# Patient Record
Sex: MALE | Race: WHITE | URBAN - NONMETROPOLITAN AREA
[De-identification: names, ages, dates, MRNs, and addresses within clinical notes are randomized per-mention and may not be internally consistent; named-entity substitution may affect disease eponyms.]

---

## 2017-01-03 ENCOUNTER — AMBULATORY - GICH (OUTPATIENT)
Dept: FAMILY MEDICINE | Facility: OTHER | Age: 82
End: 2017-01-03

## 2017-01-03 DIAGNOSIS — M19.90 OSTEOARTHRITIS: ICD-10-CM

## 2017-01-03 DIAGNOSIS — R19.7 DIARRHEA: ICD-10-CM

## 2017-03-02 ENCOUNTER — HISTORY (OUTPATIENT)
Dept: FAMILY MEDICINE | Facility: OTHER | Age: 82
End: 2017-03-02

## 2017-03-02 ENCOUNTER — OFFICE VISIT - GICH (OUTPATIENT)
Dept: FAMILY MEDICINE | Facility: OTHER | Age: 82
End: 2017-03-02

## 2017-03-02 DIAGNOSIS — Z23 ENCOUNTER FOR IMMUNIZATION: ICD-10-CM

## 2017-03-02 DIAGNOSIS — I10 ESSENTIAL (PRIMARY) HYPERTENSION: ICD-10-CM

## 2017-03-02 DIAGNOSIS — E80.4 GILBERT'S SYNDROME: ICD-10-CM

## 2017-03-02 DIAGNOSIS — M10.9 GOUT: ICD-10-CM

## 2017-03-02 DIAGNOSIS — R71.8 OTHER ABNORMALITY OF RED BLOOD CELLS: ICD-10-CM

## 2017-03-02 DIAGNOSIS — N40.1 ENLARGED PROSTATE WITH LOWER URINARY TRACT SYMPTOMS (LUTS): ICD-10-CM

## 2017-03-02 DIAGNOSIS — M19.90 OSTEOARTHRITIS: ICD-10-CM

## 2017-03-02 DIAGNOSIS — N42.9 DISORDER OF PROSTATE: ICD-10-CM

## 2017-03-02 DIAGNOSIS — E29.1 TESTICULAR HYPOFUNCTION: ICD-10-CM

## 2017-03-02 DIAGNOSIS — K62.5 HEMORRHAGE OF ANUS AND RECTUM: ICD-10-CM

## 2017-03-02 DIAGNOSIS — E78.5 HYPERLIPIDEMIA: ICD-10-CM
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
## 2017-03-02 LAB
A/G RATIO - HISTORICAL: 1.5 (ref 1–2)
ABSOLUTE BASOPHILS - HISTORICAL: 0.1 THOU/CU MM
ABSOLUTE EOSINOPHILS - HISTORICAL: 0.4 THOU/CU MM
ABSOLUTE LYMPHOCYTES - HISTORICAL: 1.6 THOU/CU MM (ref 0.9–2.9)
ABSOLUTE MONOCYTES - HISTORICAL: 0.4 THOU/CU MM
ABSOLUTE NEUTROPHILS - HISTORICAL: 3 THOU/CU MM (ref 1.7–7)
ALBUMIN SERPL-MCNC: 4.2 G/DL (ref 3.5–5.7)
ALP SERPL-CCNC: 68 IU/L (ref 34–104)
ALT (SGPT) - HISTORICAL: 21 IU/L (ref 7–52)
ANION GAP - HISTORICAL: 11 (ref 5–18)
AST SERPL-CCNC: 23 IU/L (ref 13–39)
BASOPHILS # BLD AUTO: 1 %
BILIRUB SERPL-MCNC: 1 MG/DL (ref 0.3–1)
BUN SERPL-MCNC: 20 MG/DL (ref 7–25)
BUN/CREAT RATIO - HISTORICAL: 16
CALCIUM SERPL-MCNC: 10 MG/DL (ref 8.6–10.3)
CHLORIDE SERPLBLD-SCNC: 105 MMOL/L (ref 98–107)
CHOL/HDL RATIO - HISTORICAL: 3.17
CHOLESTEROL TOTAL: 171 MG/DL
CO2 SERPL-SCNC: 26 MMOL/L (ref 21–31)
CREAT SERPL-MCNC: 1.25 MG/DL (ref 0.7–1.3)
EOSINOPHIL NFR BLD AUTO: 7.1 %
ERYTHROCYTE [DISTWIDTH] IN BLOOD BY AUTOMATED COUNT: 13.7 % (ref 11.5–15.5)
GFR IF NOT AFRICAN AMERICAN - HISTORICAL: 55 ML/MIN/1.73M2
GLOBULIN - HISTORICAL: 2.8 G/DL (ref 2–3.7)
GLUCOSE SERPL-MCNC: 119 MG/DL (ref 70–105)
HCT VFR BLD AUTO: 48.5 % (ref 37–53)
HDLC SERPL-MCNC: 54 MG/DL (ref 23–92)
HEMOGLOBIN: 16.2 G/DL (ref 13.5–17.5)
LDLC SERPL CALC-MCNC: 108 MG/DL
LYMPHOCYTES NFR BLD AUTO: 28.8 % (ref 20–44)
MCH RBC QN AUTO: 33.7 PG (ref 26–34)
MCHC RBC AUTO-ENTMCNC: 33.3 G/DL (ref 32–36)
MCV RBC AUTO: 101 FL (ref 80–100)
MONOCYTES NFR BLD AUTO: 7 %
NEUTROPHILS NFR BLD AUTO: 56.1 % (ref 42–72)
NON-HDL CHOLESTEROL - HISTORICAL: 117 MG/DL
PATIENT STATUS - HISTORICAL: ABNORMAL
PLATELET # BLD AUTO: 195 THOU/CU MM (ref 140–440)
PMV BLD: 7.7 FL (ref 6.5–11)
POTASSIUM SERPL-SCNC: 4.2 MMOL/L (ref 3.5–5.1)
PROT SERPL-MCNC: 7 G/DL (ref 6.4–8.9)
PSA TOTAL (DIAGNOSTIC) - HISTORICAL: 2.4 NG/ML
RED BLOOD COUNT - HISTORICAL: 4.79 MIL/CU MM (ref 4.3–5.9)
SODIUM SERPL-SCNC: 142 MMOL/L (ref 133–143)
TRIGL SERPL-MCNC: 45 MG/DL
URATE SERPL-MCNC: 6.1 MG/DL (ref 4.4–7.6)
WHITE BLOOD COUNT - HISTORICAL: 5.4 THOU/CU MM (ref 4.5–11)

## 2017-03-03 LAB — VIT B12 SERPL-MCNC: 601 PG/ML (ref 180–914)

## 2017-03-05 LAB
TESTOSTERONE FREE - HISTORICAL: 9.23 NG/DL
TESTOSTERONE,TOTAL - HISTORICAL: 710 NG/DL

## 2017-08-07 ENCOUNTER — COMMUNICATION - GICH (OUTPATIENT)
Dept: FAMILY MEDICINE | Facility: OTHER | Age: 82
End: 2017-08-07

## 2017-08-30 ENCOUNTER — OFFICE VISIT - GICH (OUTPATIENT)
Dept: FAMILY MEDICINE | Facility: OTHER | Age: 82
End: 2017-08-30

## 2017-08-30 ENCOUNTER — HISTORY (OUTPATIENT)
Dept: FAMILY MEDICINE | Facility: OTHER | Age: 82
End: 2017-08-30

## 2017-08-30 DIAGNOSIS — R40.0 SOMNOLENCE: ICD-10-CM

## 2017-08-30 DIAGNOSIS — I10 ESSENTIAL (PRIMARY) HYPERTENSION: ICD-10-CM

## 2017-08-30 DIAGNOSIS — L57.0 ACTINIC KERATOSIS: ICD-10-CM

## 2017-08-30 DIAGNOSIS — R53.83 OTHER FATIGUE: ICD-10-CM

## 2017-08-30 LAB
A/G RATIO - HISTORICAL: 1.5 (ref 1–2)
ABSOLUTE BASOPHILS - HISTORICAL: 0.1 THOU/CU MM
ABSOLUTE EOSINOPHILS - HISTORICAL: 0.4 THOU/CU MM
ABSOLUTE IMMATURE GRANULOCYTES(METAS,MYELOS,PROS) - HISTORICAL: 0 THOU/CU MM
ABSOLUTE LYMPHOCYTES - HISTORICAL: 1.6 THOU/CU MM (ref 0.9–2.9)
ABSOLUTE MONOCYTES - HISTORICAL: 0.5 THOU/CU MM
ABSOLUTE NEUTROPHILS - HISTORICAL: 3.1 THOU/CU MM (ref 1.7–7)
ALBUMIN SERPL-MCNC: 4.1 G/DL (ref 3.5–5.7)
ALP SERPL-CCNC: 73 IU/L (ref 34–104)
ALT (SGPT) - HISTORICAL: 24 IU/L (ref 7–52)
ANION GAP - HISTORICAL: 10 (ref 5–18)
AST SERPL-CCNC: 23 IU/L (ref 13–39)
BASOPHILS # BLD AUTO: 1.4 %
BILIRUB SERPL-MCNC: 1 MG/DL (ref 0.3–1)
BUN SERPL-MCNC: 25 MG/DL (ref 7–25)
BUN/CREAT RATIO - HISTORICAL: 20
CALCIUM SERPL-MCNC: 10 MG/DL (ref 8.6–10.3)
CHLORIDE SERPLBLD-SCNC: 105 MMOL/L (ref 98–107)
CO2 SERPL-SCNC: 27 MMOL/L (ref 21–31)
CREAT SERPL-MCNC: 1.23 MG/DL (ref 0.7–1.3)
EOSINOPHIL NFR BLD AUTO: 6.7 %
ERYTHROCYTE [DISTWIDTH] IN BLOOD BY AUTOMATED COUNT: 14.4 % (ref 11.5–15.5)
GFR IF NOT AFRICAN AMERICAN - HISTORICAL: 56 ML/MIN/1.73M2
GLOBULIN - HISTORICAL: 2.7 G/DL (ref 2–3.7)
GLUCOSE SERPL-MCNC: 102 MG/DL (ref 70–105)
HCT VFR BLD AUTO: 43.3 % (ref 37–53)
HEMOGLOBIN: 15 G/DL (ref 13.5–17.5)
IMMATURE GRANULOCYTES(METAS,MYELOS,PROS) - HISTORICAL: 0.2 %
LYMPHOCYTES NFR BLD AUTO: 28.4 % (ref 20–44)
MCH RBC QN AUTO: 34.8 PG (ref 26–34)
MCHC RBC AUTO-ENTMCNC: 34.6 G/DL (ref 32–36)
MCV RBC AUTO: 101 FL (ref 80–100)
MONOCYTES NFR BLD AUTO: 8 %
NEUTROPHILS NFR BLD AUTO: 55.3 % (ref 42–72)
PLATELET # BLD AUTO: 181 THOU/CU MM (ref 140–440)
PMV BLD: 9.9 FL (ref 6.5–11)
POTASSIUM SERPL-SCNC: 4.3 MMOL/L (ref 3.5–5.1)
PROT SERPL-MCNC: 6.8 G/DL (ref 6.4–8.9)
RED BLOOD COUNT - HISTORICAL: 4.31 MIL/CU MM (ref 4.3–5.9)
SODIUM SERPL-SCNC: 142 MMOL/L (ref 133–143)
T4 FREE SERPL-MCNC: 1.02 NG/DL (ref 0.58–1.64)
TSH - HISTORICAL: 1.5 UIU/ML (ref 0.34–5.6)
WHITE BLOOD COUNT - HISTORICAL: 5.6 THOU/CU MM (ref 4.5–11)

## 2017-09-09 ENCOUNTER — COMMUNICATION - GICH (OUTPATIENT)
Dept: FAMILY MEDICINE | Facility: OTHER | Age: 82
End: 2017-09-09

## 2017-09-09 DIAGNOSIS — M19.90 OSTEOARTHRITIS: ICD-10-CM

## 2017-10-11 ENCOUNTER — AMBULATORY - GICH (OUTPATIENT)
Dept: FAMILY MEDICINE | Facility: OTHER | Age: 82
End: 2017-10-11

## 2017-10-11 DIAGNOSIS — Z23 ENCOUNTER FOR IMMUNIZATION: ICD-10-CM

## 2017-10-16 ENCOUNTER — COMMUNICATION - GICH (OUTPATIENT)
Dept: FAMILY MEDICINE | Facility: OTHER | Age: 82
End: 2017-10-16

## 2017-10-16 DIAGNOSIS — M19.90 OSTEOARTHRITIS: ICD-10-CM

## 2017-12-28 NOTE — TELEPHONE ENCOUNTER
Patient Information     Patient Name MRN Sex Ha Parker [id number] Male 1934      Telephone Encounter by Armando Arrington RN at 2017 11:49 AM     Author:  Armando Arrington RN Service:  (none) Author Type:  NURS- Registered Nurse     Filed:  2017 11:55 AM Encounter Date:  2017 Status:  Signed     :  Armando Arrington RN (NURS- Registered Nurse)            This is a Refill request from: [facility]  Name of Medication: Tylenol #3  Quantity requested: 40 tabs with 1 refill  Last fill date: 1/3/17 as per rx request  Due for refill: Yes, as per chart review and per rx request  Last visit with SMITH ALBERTS was on: 2017 in [facility]  PCP:  Smtih Alberts MD  Controlled Substance Agreement: N/A   Diagnosis r/t this medication request: Osteoarthritis, unspecified osteoarthritis type, unspecified site    Chart review shows that patient was seen by PCP on 17. Rx as requested was noted on med list at time of office visit with PCP. No changes noted to rx as requested at time of office visit. Writer is unable to refill rx as requested. Will route rx request to PCP for his consideration/approval.     Unable to complete prescription refill per RN Medication Refill Policy.................... Armando Arrington RN ....................  2017   11:50 AM

## 2017-12-28 NOTE — TELEPHONE ENCOUNTER
Patient Information     Patient Name MRN Sex Ha Parker [id number] Male 1934      Telephone Encounter by Branden Hernandez LPN at 2017  2:37 PM     Author:  Branden Hernandez LPN Service:  (none) Author Type:  NURS- Licensed Practical Nurse     Filed:  2017  2:38 PM Encounter Date:  2017 Status:  Signed     :  Branden Hernandez LPN (NURS- Licensed Practical Nurse)            I could take a same day or approval request. Please advise.  Branden Hernandez LPN ..............2017 2:38 PM

## 2017-12-28 NOTE — PROGRESS NOTES
Patient Information     Patient Name MRN Ha Ma [id number] Male 1934      Progress Notes by Garry Alberts MD at 2017 11:00 AM     Author:  Garry Alberts MD Service:  (none) Author Type:  Physician     Filed:  2017  7:12 PM Encounter Date:  2017 Status:  Signed     :  Garry Alberts MD (Physician)            Nursing Notes:   Sonal Moreno  2017  4:38 PM  Addendum  Pt present for personal reasons. Pt declined Tdap and Pneumococcal vaccines.  Sonal Mylesill ....................  2017   11:11 AM      SUBJECTIVE:  Ha Jacome  is a 83 y.o. male who comes in to talk about various different issues.    His blood pressure at home is 120/60's.     He is wearing a corset for his back. Which helps his back pain significantly.    [place] He had a bout of prostatitis and took Cipro.  He had some urge incontinence.  He had pain in the prostate.  He saw a urologist in [place].  He changed from Cipro to Sulfa. He was put on Myrbetric for urgency. It got better gradually.  He had a bout of bright red blood per rectum. He talked to Dr. Brandt from GI and had normal colonoscopy.  He has had some yellow mucus.  He has no pain.    He has had a major change in energy level.  He was able to walk 2 miles last year with the dog, but now he can do a mile and is exhausted. He takes daily B 12 and vitamin D.  No shortness of breath but exercise tolerance is not good.  He sleeps from 9:30 -5 a.m.  He sleeps some more.  He doesn't snore. He feels rested. He has some somnolence during the day. He will be sleepy driving.  He has never had a sleep study.     He has used Effudex and has gotten rid of most of his actinics but needs some frozen.         Past Medical, Family, and Social History reviewed and updated as noted below.   ROS is negative except as noted above       Allergies     Allergen  Reactions     Hay Fever & Allergy Relief  [Chlorpheniramine-Phenylpropan] Runny Nose   ,   Family History       Problem   Relation Age of Onset     Cancer  Father       prostate cancer in his late 70's treatment with radiation therapy.         Heart Disease  Father      Genetic  Other      Mother   in her mid 80's of adrenal crisis after steroid dependent rheumatoid arthritis and acute illness.  ~Father   at age 94.  He had carotid disease, prostate cancer in his late 70's treatment with radiation therapy.     ,   Current Outpatient Prescriptions on File Prior to Visit       Medication  Sig Dispense Refill     acetaminophen-codeine, 300-30 mg, (TYLENOL #3) tablet Take 1-2 tablets by mouth every 4 hours if needed. Max acetaminophen dose: 4000mg in 24 hrs. 40 tablet 1     cholecalciferol (VITAMIN D) 1,000 unit capsule Take 1 capsule by mouth once daily.  0     diphenoxylate-atropine, 2.5-0.025 mg, (LOMOTIL) 2.5-0.025 mg tablet Take 1 tablet by mouth 4 times daily if needed for Diarrhea.  0     diphenoxylate-atropine, 2.5-0.025 mg, (LOMOTIL) 2.5-0.025 mg tablet Take 1 tablet by mouth 4 times daily if needed for Diarrhea. 60 tablet 3     enalapril (VASOTEC) 10 mg tablet Take 1 tablet by mouth once daily.       fluorouracil 5% topical (EFUDEX) 5 % cream Apply  topically to affected area(s) 2 times daily. 40 g 1     hydrochlorothiazide 50 mg tablet Take 1 tablet by mouth once daily. 60 tablet 4     latanoprost (XALATAN) 0.005 % ophthalmic solution        magnesium oxide 250 mg tablet Take 1 tablet by mouth once daily.  0     potassium chloride (K-DUR, KLOR-CON M10) 10 mEq tablet Take 2 tablets by mouth 2 times daily with meals.  0     RABEprazole (ACIPHEX) 20 mg tablet Take 1 tablet by mouth once daily.  0     tamsulosin (FLOMAX) 0.4 mg capsule TAKE 1 CAPSULE BY MOUTH EVERY 12 HOURS 60 capsule 11     terazosin (HYTRIN) 2 mg capsule Take 1 capsule by mouth at bedtime. Take along with 5 mg tablet  0     terazosin (HYTRIN) 5 mg capsule Take 1 capsule by  mouth 2 times daily.  0     No current facility-administered medications on file prior to visit.    ,   Past Medical History:     Diagnosis  Date     Gilbert's syndrome      Hypertension    ,   Patient Active Problem List      Diagnosis Date Noted     Gilbert's syndrome 06/21/2013     Actinic keratoses 06/21/2013     HYPERTENSION      HYPERTROPHY PROSTATE W/O UR OBST \T\ OTH LUTS      OSTEOARTHRITIS      GLAUCOMA    ,   Past Surgical History:      Procedure  Laterality Date     2 level laminectomy, 3 level foramenotomy  9/8/08    MD [place]       CATARACT REMOVAL  12/26/18    right       COLONOSCOPY  11/30/2016    normal, Dr. Brandt [facility]       CYSTOSCOPY  8/2015    Ming Chou MD  normal       ESOPHAGOGASTRODUODENOSCOPY  11/30/2016    Normal. No further follow up for Ortega's needed. Dr. Brandt       foraminotomy L2-3 repeat  9/2010    MD [place]       HX KNEE REPLACEMENT (aka TKA)  2007    left       INGUINAL HERNIA REPAIR BILATERAL  1/2/09    laparoscopic       KNEE REPLACEMENT  2006    right       steroid injection x2 prior to surgery  2008    and   Social History      Substance Use Topics        Smoking status:  Never Smoker     Smokeless tobacco:  Never Used     Alcohol use  3.5 oz/week     7 Standard drinks or equivalent per week      OBJECTIVE:  /92  Pulse 60  Wt 83.5 kg (184 lb)  BMI 29.25 kg/m2   EXAM:  Alert and cooperative, no distress. Examination of his face reveals 8 actinic keratoses. On his right temporal region is a slightly raised round lesion that may be a nodular basal cell. All these lesions are frozen lightly with liquid nitrogen and the more suspicious one is frozen twice.    No scleral icterus. Mucous membranes are moist. Neck is supple without significant adenopathy or thyromegaly. Lungs are clear, no rales rhonchi or wheezes are heard. Cardiac RRR without murmur.    Results for orders placed or performed in visit on 08/30/17       T4,FREE      Result  Value Ref Range    T4,FREE 1.02 0.58 - 1.64 ng/dL   TSH      Result  Value Ref Range    TSH 1.50 0.34 - 5.60 uIU/mL   COMP METABOLIC PANEL      Result  Value Ref Range    SODIUM 142 133 - 143 mmol/L    POTASSIUM 4.3 3.5 - 5.1 mmol/L    CHLORIDE 105 98 - 107 mmol/L    CO2,TOTAL 27 21 - 31 mmol/L    ANION GAP 10 5 - 18                    GLUCOSE 102 70 - 105 mg/dL    CALCIUM 10.0 8.6 - 10.3 mg/dL    BUN 25 7 - 25 mg/dL    CREATININE 1.23 0.70 - 1.30 mg/dL    BUN/CREAT RATIO           20                    GFR if African American >60 >60 ml/min/1.73m2    GFR if not  56 (L) >60 ml/min/1.73m2    ALBUMIN 4.1 3.5 - 5.7 g/dL    PROTEIN,TOTAL 6.8 6.4 - 8.9 g/dL    GLOBULIN                  2.7 2.0 - 3.7 g/dL    A/G RATIO 1.5 1.0 - 2.0                    BILIRUBIN,TOTAL 1.0 0.3 - 1.0 mg/dL    ALK PHOSPHATASE 73 34 - 104 IU/L    ALT (SGPT) 24 7 - 52 IU/L    AST (SGOT) 23 13 - 39 IU/L   CBC WITH AUTO DIFFERENTIAL      Result  Value Ref Range    WHITE BLOOD COUNT         5.6 4.5 - 11.0 thou/cu mm    RED BLOOD COUNT           4.31 4.30 - 5.90 mil/cu mm    HEMOGLOBIN                15.0 13.5 - 17.5 g/dL    HEMATOCRIT                43.3 37.0 - 53.0 %    MCV                       101 (H) 80 - 100 fL    MCH                       34.8 (H) 26.0 - 34.0 pg    MCHC                      34.6 32.0 - 36.0 g/dL    RDW                       14.4 11.5 - 15.5 %    PLATELET COUNT            181 140 - 440 thou/cu mm    MPV                       9.9 6.5 - 11.0 fL    NEUTROPHILS               55.3 42.0 - 72.0 %    LYMPHOCYTES               28.4 20.0 - 44.0 %    MONOCYTES                 8.0 <12.0 %    EOSINOPHILS               6.7 <8.0 %    BASOPHILS                 1.4 <3.0 %    IMMATURE GRANULOCYTES(METAS,MYELOS,PROS) 0.2 %    ABSOLUTE NEUTROPHILS      3.1 1.7 - 7.0 thou/cu mm    ABSOLUTE LYMPHOCYTES      1.6 0.9 - 2.9 thou/cu mm    ABSOLUTE MONOCYTES        0.5 <0.9 thou/cu mm    ABSOLUTE EOSINOPHILS       0.4 <0.5 thou/cu mm    ABSOLUTE BASOPHILS        0.1 <0.3 thou/cu mm    ABSOLUTE IMMATURE GRANULOCYTES(METAS,MYELOS,PROS) 0.0 <=0.3 thou/cu mm      ASSESSMENT/Plan :      Ha was seen today for concerns.    Diagnoses and all orders for this visit:    Daytime somnolence  -     CBC AND DIFFERENTIAL; Future  -     T4,FREE; Future  -     TSH; Future  -     COMP METABOLIC PANEL; Future  -     AMB CONSULT TO SLEEP STUDIES; Future  -     CBC AND DIFFERENTIAL  -     T4,FREE  -     TSH  -     COMP METABOLIC PANEL  -     CBC WITH AUTO DIFFERENTIAL    Actinic keratoses    HYPERTENSION    Fatigue, unspecified type  -     CBC AND DIFFERENTIAL; Future  -     T4,FREE; Future  -     TSH; Future  -     COMP METABOLIC PANEL; Future  -     AMB CONSULT TO SLEEP STUDIES; Future  -     CBC AND DIFFERENTIAL  -     T4,FREE  -     TSH  -     COMP METABOLIC PANEL  -     CBC WITH AUTO DIFFERENTIAL    Advised regarding the pain and vesiculation reaction that could be expected from cryotherapy.    I suspect that his fatigue may be multifactorial. Certainly has no metabolic reason for that we can see. Some of it could be age-related however it's unusual that would come on so quickly over the course of the last year. He does have excessive daytime somnolence and so may have some sleep disorder such as PLACIDO or central sleep apnea so is referred for sleep consultation.    We'll employ expectant management with regard to rectal mucus, and is reassuring that he had a normal colonoscopy.    A total of 25 minutes was spent with the patient, greater than 50% of the time was spent in counseling/discussion of the aforementioned concerns.     Garry Alberts MD

## 2017-12-28 NOTE — TELEPHONE ENCOUNTER
Patient Information     Patient Name MRN Ha Ma [id number] Male 1934      Telephone Encounter by Garry Alberts MD at 2017  4:29 PM     Author:  Garry Alberts MD Service:  (none) Author Type:  Physician     Filed:  2017  4:30 PM Encounter Date:  2017 Status:  Signed     :  Garry Alberts MD (Physician)            Ok to put him in a provider fill, just find out if he needs a short or long appointment.  Garry Alberts MD ....................  2017   4:30 PM

## 2017-12-28 NOTE — TELEPHONE ENCOUNTER
Patient Information     Patient Name MRN Ha Ma [id number] Male 1934      Telephone Encounter by Ava Paulson at 10/16/2017 11:41 AM     Author:  Ava Paulson Service:  (none) Author Type:  (none)     Filed:  10/16/2017 11:41 AM Encounter Date:  10/16/2017 Status:  Signed     :  Ava Paulson            Signed prescription faxed.  Ava Paulson LPN.............10/16/2017 11:41 AM

## 2017-12-28 NOTE — TELEPHONE ENCOUNTER
Patient Information     Patient Name MRN Ha Ma [id number] Male 1934      Telephone Encounter by Ava Paulson at 2017  1:56 PM     Author:  Ava Paulson Service:  (none) Author Type:  (none)     Filed:  2017  1:56 PM Encounter Date:  2017 Status:  Signed     :  Ava Paulson            Signed prescription faxed.  Ava Paulson LPN.............2017 1:56 PM

## 2017-12-28 NOTE — TELEPHONE ENCOUNTER
Patient Information     Patient Name MRN Ha Ma [id number] Male 1934      Telephone Encounter by Mary Alvarez at 2017  4:54 PM     Author:  Mary Alvarez Service:  (none) Author Type:  (none)     Filed:  2017  4:55 PM Encounter Date:  2017 Status:  Signed     :  Mary Alvarez            Patient was called and setup with appointment. Mary Alvarez LPN...............2017   4:55 PM

## 2017-12-30 NOTE — NURSING NOTE
Patient Information     Patient Name MRN Ha Ma [id number] Male 1934      Nursing Note by Sonal Moreno at 2017 11:00 AM     Author:  Sonal Moreno  Service:  (none) Author Type:  (none)     Filed:  2017  4:38 PM  Encounter Date:  2017 Status:  Addendum     :  Sonal Moreno        Related Notes: Original Note by Sonal Moreno filed at 2017 11:48 AM            Pt present for personal reasons. Pt declined Tdap and Pneumococcal vaccines.  Sonal Henson ....................  2017   11:11 AM

## 2018-01-03 NOTE — NURSING NOTE
Patient Information     Patient Name MRN Ha Ma [id number] Male 1934      Nursing Note by Jordyn Bedolla at 3/2/2017  8:00 AM     Author:  Jordyn Bedolla Service:  (none) Author Type:  (none)     Filed:  3/2/2017  8:36 AM Encounter Date:  3/2/2017 Status:  Signed     :  Jordyn Bedolla LPN........................3/2/2017  8:10 AM

## 2018-01-03 NOTE — PROGRESS NOTES
"Patient Information     Patient Name MRN Ha Ma [id number] Male 1934      Progress Notes by Garry Alberts MD at 3/2/2017  8:00 AM     Author:  Garry Alberts MD Service:  (none) Author Type:  Physician     Filed:  3/3/2017  3:03 PM Encounter Date:  3/2/2017 Status:  Signed     :  Garry Alberts MD (Physician)            Nursing Notes:   Jordyn Bedolla  3/2/2017  8:36 AM  Signed  Physical   Jordyn Bedolla LPN........................3/2/2017  8:10 AM   SUBJECTIVE:  Ha Jacome  is a 83 y.o. male who comes in today for complete evaluation.    He had recent EGD and colonoscopy that were normal. 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 He has been getting Rabeprazole from a [place] [facility], but their brand is Pariet, ours is Aciphex.    He has had intermittent cramping and diarrhea. He will have normal formed BM in the a.m, then a few hours later will have loose stools. He had normal biopsy and no evidence of colitis. He had negative C. Diff. His Celiac panel was negative. He does have diverticula. He had flown to [place] and had food poisoning and had a month of diarrhea. He had negative cultures at that time and had no evidence of giardia. He had an episode of bright red blood. He would like a rectal exam today.     He has noted significant decrease in strength and stamina over the last year.  He read a study about testosterone level.     He had a normal CT urogram and cystoscopy summer 2015 by Dr. Chou at [facility] for hematuria. It was felt to be coming from his enlarged prostate. He continues on Flomax and Cialis for BPH and obstructive symptoms.    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 Salomon.  He has some actinics.    He has a history of back surgery and a year ago, we did MRI of the thoracic spine and lumbar spine showing the following:     \"Transitional lumbosacral anatomy " "with partial sacralization of L5 and a fully formed L5-S1 intervertebral disc. This nomenclature differs from the prior reports but is a result of counting down from C2.     Fairly minimal degenerative changes of the thoracic spine without significant spinal or foraminal stenosis.     Advanced progressive degenerative changes the lumbar spine with progressive retrolisthesis of L1 on L2 and anterolisthesis of L3 on L4. Spinal stenosis is severe at L3-4. Extensive high-grade foraminal stenosis is seen.\"    He had lipids, chemistry, PSA that were normal in November 2015. His dad had late onset prostate cancer.    He had something on his chest x-ray that was felt to be an old rib fracture and we did follow-up chest x-ray in July 2016 and no further follow-up was felt to be needed.    He continues on enalapril for hypertension. He dropped down from 20 mg to 10 mg. He is on He needs labs and renewal of medications. He has had hypertension since his 20's.     He had trigeminal neuralgia in his 30's. He treated with prednisone and while on it had a bad headache and his blood pressure was jonathan high. He thinks he is very salt sensitive. He was started on Inderal and a diuretic. He took that from age 36.  He had some short term memory issues and changed from Inderal to Vasotec. He is also on HCTZ. He had a single bout of gout years ago and was put on Allopurinol.  He has has had trouble with dependent edema. He bumped up to 50 mg and had no trouble. He had a sebaceous cyst in the groin crease that drained. He took Bactrim DS for 5 days and got bad gout in his big toe.  He would like his uric acid checked. He dropped back to 25 mg on HCTZ and his weight is up a bit and blood pressure is up a bit.     He is due for TDAP and has not had pneumonia series.     Past Medical, Family, and Social History reviewed and updated as noted below.   ROS is negative except as noted above       Allergies     Allergen  Reactions     Hay Fever & " Allergy Relief [Chlorpheniramine-Phenylpropan] Runny Nose   ,   Family History       Problem   Relation Age of Onset     Genetic  Other      Mother   in her mid 80's of adrenal crisis after steroid dependent rheumatoid arthritis and acute illness.  ~Father   at age 94.  He had carotid disease, prostate cancer in his late 70's treatment with radiation therapy.       Cancer  Father       prostate cancer in his late 70's treatment with radiation therapy.         Heart Disease  Father    ,   Current Outpatient Prescriptions on File Prior to Visit       Medication  Sig Dispense Refill     acetaminophen-codeine, 300-30 mg, (TYLENOL #3) tablet Take 1-2 tablets by mouth every 4 hours if needed. Max acetaminophen dose: 4000mg in 24 hrs. 40 tablet 1     diphenoxylate-atropine, 2.5-0.025 mg, (LOMOTIL) 2.5-0.025 mg tablet Take 1 tablet by mouth 4 times daily if needed for Diarrhea. 60 tablet 3     fluorouracil 5% topical (EFUDEX) 5 % cream Apply  topically to affected area(s) 2 times daily. 40 g 1     hydrochlorothiazide 50 mg tablet Take 1 tablet by mouth once daily. 60 tablet 4     latanoprost (XALATAN) 0.005 % ophthalmic solution        potassium chloride (K-DUR, KLOR-CON M10) 10 mEq tablet Take 2 tablets by mouth 2 times daily with meals.  0     tamsulosin (FLOMAX) 0.4 mg capsule TAKE 1 CAPSULE BY MOUTH EVERY 12 HOURS 60 capsule 11     terazosin (HYTRIN) 5 mg capsule Take 1 capsule by mouth 2 times daily.  0     No current facility-administered medications on file prior to visit.    ,   Past Medical History     Diagnosis  Date     Gilbert's syndrome      Hypertension    ,   Patient Active Problem List      Diagnosis Date Noted     Gilbert's syndrome 2013     Actinic keratoses 2013     HYPERTENSION      HYPERTROPHY PROSTATE W/O UR OBST \T\ OTH LUTS      OSTEOARTHRITIS      GLAUCOMA    ,   Past Surgical History       Procedure   Laterality Date     Knee replacement        right       Hx knee  "replacement (aka tka)   2007     left       Cataract removal   12/26/18     right       Inguinal hernia repair bilateral   1/2/09     laparoscopic       Steroid injection x2 prior to surgery   2008     2 level laminectomy, 3 level foramenotomy   9/8/08     MD [place]       Foraminotomy l2-3 repeat   9/2010     MD [place]       Cystoscopy   8/2015     Ming Chou MD  normal       Colonoscopy   11/30/2016     normal, Dr. Brandt [facility]       Esophagogastroduodenoscopy   11/30/2016     Normal. No further follow up for Ortega's needed. Dr. Brandt      and   Social History      Substance Use Topics        Smoking status:  Never Smoker     Smokeless tobacco:  Never Used     Alcohol use  3.5 oz/week     7 drink(s) per week      OBJECTIVE:  Visit Vitals       /70     Pulse 60     Ht 1.689 m (5' 6.5\")     Wt 85.3 kg (188 lb)     BMI 29.89 kg/m2      EXAM:  General Appearance: Pleasant, alert, appropriate appearance for age. No acute distress  Head Exam: Normal. Normocephalic, atraumatic.  Eye Exam:  Normal external eye, conjunctiva, lids, cornea. BREEZY. EOMI  Ear Exam: Normal TM's bilaterally. Normal auditory canals and external ears. Non-tender.  Nose Exam: Normal external nose, mucus membranes, and septum.  OroPharynx Exam:  Dental hygiene adequate. Normal buccal mucosa. Normal pharynx.  Neck Exam:  Supple, no masses or nodes. No audible bruits  Thyroid Exam: No nodules or enlargement.  Chest/Respiratory Exam: Normal chest wall and respirations. Clear to auscultation.  Cardiovascular Exam: Regular rate and rhythm. S1, S2, no murmur, click, gallop, or rubs.  Gastrointestinal Exam: Soft, non-tender, no masses or organomegaly.  Rectal Exam: Normal sphincter tone. No masses noted. Prostate is normal size for age, smooth, and symmetric without nodularity.   Lymphatic Exam: Non-palpable nodes in neck, clavicular, axillary, or inguinal regions.  Musculoskeletal Exam: Back is " straight and non-tender, full ROM of upper and lower extremities.  Foot Exam: Left and right foot: good pedal pulses  Skin: no rash or abnormalities  Neurologic Exam: Nonfocal, normal gross motor, tone coordination and no tremor.  Psychiatric Exam: Alert and oriented - appropriate affect.     Results for orders placed or performed in visit on 03/02/17      COMP METABOLIC PANEL      Result  Value Ref Range    SODIUM 142 133 - 143 mmol/L    POTASSIUM 4.2 3.5 - 5.1 mmol/L    CHLORIDE 105 98 - 107 mmol/L    CO2,TOTAL 26 21 - 31 mmol/L    ANION GAP 11 5 - 18                    GLUCOSE 119 (H) 70 - 105 mg/dL    CALCIUM 10.0 8.6 - 10.3 mg/dL    BUN 20 7 - 25 mg/dL    CREATININE 1.25 0.70 - 1.30 mg/dL    BUN/CREAT RATIO           16                    GFR if African American >60 >60 ml/min/1.73m2    GFR if not African American 55 (L) >60 ml/min/1.73m2    ALBUMIN 4.2 3.5 - 5.7 g/dL    PROTEIN,TOTAL 7.0 6.4 - 8.9 g/dL    GLOBULIN                  2.8 2.0 - 3.7 g/dL    A/G RATIO 1.5 1.0 - 2.0                    BILIRUBIN,TOTAL 1.0 0.3 - 1.0 mg/dL    ALK PHOSPHATASE 68 34 - 104 IU/L    ALT (SGPT) 21 7 - 52 IU/L    AST (SGOT) 23 13 - 39 IU/L   PSA TOTAL (DIAGNOSTIC)      Result  Value Ref Range    PSA TOTAL (DIAGNOSTIC) 2.398 <=3.100 ng/mL   LIPID PANEL      Result  Value Ref Range    CHOLESTEROL,TOTAL 171 <200 mg/dL    TRIGLYCERIDES 45 <150 mg/dL    HDL CHOLESTEROL 54 23 - 92 mg/dL    NON-HDL CHOLESTEROL 117 <145 mg/dl    CHOL/HDL RATIO            3.17 <4.50                    LDL CHOLESTEROL 108 (H) <100 mg/dL    PATIENT STATUS            FASTING                   URIC ACID      Result  Value Ref Range    URIC ACID 6.1 4.4 - 7.6 mg/dL   CBC WITH AUTO DIFFERENTIAL      Result  Value Ref Range    WHITE BLOOD COUNT         5.4 4.5 - 11.0 thou/cu mm    RED BLOOD COUNT           4.79 4.30 - 5.90 mil/cu mm    HEMOGLOBIN                16.2 13.5 - 17.5 g/dL    HEMATOCRIT                48.5 37.0 - 53.0 %    MCV                        101 (H) 80 - 100 fL    MCH                       33.7 26.0 - 34.0 pg    MCHC                      33.3 32.0 - 36.0 g/dL    RDW                       13.7 11.5 - 15.5 %    PLATELET COUNT            195 140 - 440 thou/cu mm    MPV                       7.7 6.5 - 11.0 fL    NEUTROPHILS               56.1 42.0 - 72.0 %    LYMPHOCYTES               28.8 20.0 - 44.0 %    MONOCYTES                 7.0 <12.0 %    EOSINOPHILS               7.1 <8.0 %    BASOPHILS                 1.0 <3.0 %    ABSOLUTE NEUTROPHILS      3.0 1.7 - 7.0 thou/cu mm    ABSOLUTE LYMPHOCYTES      1.6 0.9 - 2.9 thou/cu mm    ABSOLUTE MONOCYTES        0.4 <0.9 thou/cu mm    ABSOLUTE EOSINOPHILS      0.4 <0.5 thou/cu mm    ABSOLUTE BASOPHILS        0.1 <0.3 thou/cu mm      ASSESSMENT/Plan :      Ha was seen today for physical.    Diagnoses and all orders for this visit:    HYPERTENSION  -     CBC AND DIFFERENTIAL; Future  -     COMP METABOLIC PANEL; Future  -     LIPID PANEL; Future  -     CBC AND DIFFERENTIAL  -     COMP METABOLIC PANEL  -     LIPID PANEL  -     CBC WITH AUTO DIFFERENTIAL    Benign prostatic hyperplasia with lower urinary tract symptoms, unspecified morphology  -     PSA TOTAL (DIAGNOSTIC); Future  -     PSA TOTAL (DIAGNOSTIC)    Osteoarthritis, unspecified osteoarthritis type, unspecified site  -     CBC AND DIFFERENTIAL; Future  -     CBC AND DIFFERENTIAL  -     CBC WITH AUTO DIFFERENTIAL    Gilbert's syndrome  -     COMP METABOLIC PANEL; Future  -     COMP METABOLIC PANEL    Need for vaccination with 13-polyvalent pneumococcal conjugate vaccine  -     OMNI PREVNAR 13 (AKA PNEUMOCOCCAL VACCINE 13-VALENT IM)    Need for diphtheria-tetanus-pertussis (Tdap) vaccine, adult/adolescent  -     OMNI TDAP VACCINE IM    Multiple sites, insect bite, nonvenomous, sequela  -     OMNI TDAP VACCINE IM    Rectal bleeding  -     CBC AND DIFFERENTIAL; Future  -     CBC AND DIFFERENTIAL  -     CBC WITH AUTO DIFFERENTIAL    Gout, unspecified cause,  unspecified chronicity, unspecified site  -     URIC ACID; Future  -     URIC ACID    Dyslipidemia  -     LIPID PANEL; Future  -     LIPID PANEL    Hypogonadism in male  -     TESTOSTERONE TOTAL AND FREE; Future  -     TESTOSTERONE TOTAL AND FREE    Prostate disorder  -     PSA TOTAL (DIAGNOSTIC); Future  -     PSA TOTAL (DIAGNOSTIC)    Elevated MCV  -     VITAMIN B12; Future  -     VITAMIN B12    Will notify of lab results when available. Discussed diet, exercise and healthy lifestyle changes. Continue current medications. TDAP and Prevnar today.    His rectal bleeding is most likely due to a internal hemorrhoid. I did not palpate anything worrisome rectally. He is reassured and we'll employ expectant management.    Discussed testosterone and recent articles with regard to possible benefits and discussed risks benefits and alternatives. We'll check PSA also will check testosterone and notify results when available.    Would recommend staying at 25 mg and hydrochlorothiazide. Okay to adjust enalapril based on blood pressure and certainly he is in a reasonable range now.    A total of 40 minutes was spent with the patient, greater than 50% of the time was spent in counseling/discussion of the aforementioned concerns.       Garry Alberts MD

## 2018-01-27 VITALS
BODY MASS INDEX: 28.18 KG/M2 | SYSTOLIC BLOOD PRESSURE: 138 MMHG | HEIGHT: 67 IN | DIASTOLIC BLOOD PRESSURE: 92 MMHG | DIASTOLIC BLOOD PRESSURE: 70 MMHG | WEIGHT: 188 LBS | WEIGHT: 184 LBS | SYSTOLIC BLOOD PRESSURE: 138 MMHG | HEART RATE: 60 BPM | BODY MASS INDEX: 29.51 KG/M2 | HEART RATE: 60 BPM

## 2018-01-31 ENCOUNTER — DOCUMENTATION ONLY (OUTPATIENT)
Dept: FAMILY MEDICINE | Facility: OTHER | Age: 83
End: 2018-01-31

## 2018-01-31 PROBLEM — M19.90 OSTEOARTHROSIS: Status: ACTIVE | Noted: 2018-01-31

## 2018-01-31 PROBLEM — N40.0 BENIGN PROSTATIC HYPERTROPHY: Status: ACTIVE | Noted: 2018-01-31

## 2018-01-31 PROBLEM — H40.9 GLAUCOMA: Status: ACTIVE | Noted: 2018-01-31

## 2018-01-31 PROBLEM — I10 HYPERTENSION: Status: ACTIVE | Noted: 2018-01-31

## 2018-01-31 RX ORDER — DIPHENOXYLATE HCL/ATROPINE 2.5-.025MG
1 TABLET ORAL 4 TIMES DAILY PRN
COMMUNITY
Start: 2017-01-03 | End: 2018-06-25

## 2018-01-31 RX ORDER — LATANOPROST 50 UG/ML
SOLUTION/ DROPS OPHTHALMIC
COMMUNITY
Start: 2016-01-06

## 2018-01-31 RX ORDER — TERAZOSIN 5 MG/1
10 CAPSULE ORAL AT BEDTIME
COMMUNITY
Start: 2013-06-21 | End: 2019-04-25

## 2018-01-31 RX ORDER — TAMSULOSIN HYDROCHLORIDE 0.4 MG/1
0.4 CAPSULE ORAL EVERY 12 HOURS
COMMUNITY
Start: 2015-12-07 | End: 2019-04-25

## 2018-01-31 RX ORDER — HYDROCHLOROTHIAZIDE 50 MG/1
50 TABLET ORAL DAILY
COMMUNITY
Start: 2013-08-27 | End: 2019-04-25

## 2018-01-31 RX ORDER — TERAZOSIN 2 MG/1
2 CAPSULE ORAL AT BEDTIME
COMMUNITY
Start: 2017-03-02 | End: 2019-01-31 | Stop reason: DRUGHIGH

## 2018-01-31 RX ORDER — POTASSIUM CHLORIDE 750 MG/1
20 TABLET, EXTENDED RELEASE ORAL 2 TIMES DAILY WITH MEALS
COMMUNITY
Start: 2015-11-23 | End: 2019-04-25

## 2018-01-31 RX ORDER — ENALAPRIL MALEATE 10 MG/1
20 TABLET ORAL DAILY
COMMUNITY
Start: 2017-02-12 | End: 2019-04-25

## 2018-01-31 RX ORDER — FLUOROURACIL 50 MG/G
CREAM TOPICAL
COMMUNITY
Start: 2016-02-03

## 2018-01-31 RX ORDER — RABEPRAZOLE SODIUM 20 MG/1
20 TABLET, DELAYED RELEASE ORAL DAILY
COMMUNITY
Start: 2017-03-02 | End: 2019-03-06

## 2018-01-31 RX ORDER — DIPHENOXYLATE HCL/ATROPINE 2.5-.025MG
1 TABLET ORAL 4 TIMES DAILY PRN
COMMUNITY
Start: 2017-03-02 | End: 2018-06-25

## 2018-02-01 ENCOUNTER — COMMUNICATION - GICH (OUTPATIENT)
Dept: FAMILY MEDICINE | Facility: OTHER | Age: 83
End: 2018-02-01

## 2018-02-01 DIAGNOSIS — K21.9 GASTRO-ESOPHAGEAL REFLUX DISEASE WITHOUT ESOPHAGITIS: ICD-10-CM

## 2018-02-13 NOTE — TELEPHONE ENCOUNTER
Patient Information     Patient Name MRN Ha Ma [id number] Male 1934      Telephone Encounter by Ava Paulson at 2018  2:13 PM     Author:  Ava Paulson Service:  (none) Author Type:  (none)     Filed:  2018  2:17 PM Encounter Date:  2018 Status:  Signed     :  Ava Paulson            The patient is getting his Aciphex in [place] and it is saving him #200.00 a month. He said there name for it is Pariet 20 mg. Can you print an Rx for it and leave it up front and he will send it to [facility]? Just print out Rx for Aciphex and put in parenthesis Pariet brand.   Ava Paulson LPN..................2018   2:14 PM

## 2018-03-02 ENCOUNTER — TRANSFERRED RECORDS (OUTPATIENT)
Dept: HEALTH INFORMATION MANAGEMENT | Facility: OTHER | Age: 83
End: 2018-03-02

## 2018-06-25 ENCOUNTER — OFFICE VISIT (OUTPATIENT)
Dept: FAMILY MEDICINE | Facility: OTHER | Age: 83
End: 2018-06-25
Attending: FAMILY MEDICINE
Payer: MEDICARE

## 2018-06-25 VITALS
TEMPERATURE: 96.7 F | BODY MASS INDEX: 29.09 KG/M2 | WEIGHT: 183 LBS | DIASTOLIC BLOOD PRESSURE: 82 MMHG | SYSTOLIC BLOOD PRESSURE: 134 MMHG | HEART RATE: 60 BPM

## 2018-06-25 DIAGNOSIS — N41.1 CHRONIC PROSTATITIS: ICD-10-CM

## 2018-06-25 DIAGNOSIS — M19.90 OSTEOARTHRITIS, UNSPECIFIED OSTEOARTHRITIS TYPE, UNSPECIFIED SITE: ICD-10-CM

## 2018-06-25 DIAGNOSIS — R19.7 DIARRHEA, UNSPECIFIED TYPE: Primary | ICD-10-CM

## 2018-06-25 DIAGNOSIS — N40.1 BENIGN PROSTATIC HYPERPLASIA WITH LOWER URINARY TRACT SYMPTOMS, SYMPTOM DETAILS UNSPECIFIED: ICD-10-CM

## 2018-06-25 DIAGNOSIS — I10 ESSENTIAL HYPERTENSION: ICD-10-CM

## 2018-06-25 DIAGNOSIS — R15.9 INCONTINENCE OF FECES, UNSPECIFIED FECAL INCONTINENCE TYPE: ICD-10-CM

## 2018-06-25 PROCEDURE — G0463 HOSPITAL OUTPT CLINIC VISIT: HCPCS | Performed by: FAMILY MEDICINE

## 2018-06-25 PROCEDURE — 99213 OFFICE O/P EST LOW 20 MIN: CPT | Performed by: FAMILY MEDICINE

## 2018-06-25 RX ORDER — TADALAFIL 5 MG/1
5 TABLET ORAL DAILY
Qty: 90 TABLET | Refills: 11 | Status: SHIPPED | OUTPATIENT
Start: 2018-06-25 | End: 2019-01-31

## 2018-06-25 RX ORDER — LOPERAMIDE HCL 2 MG
2 CAPSULE ORAL 4 TIMES DAILY PRN
COMMUNITY

## 2018-06-25 ASSESSMENT — PAIN SCALES - GENERAL: PAINLEVEL: NO PAIN (0)

## 2018-06-25 NOTE — PROGRESS NOTES
Nursing Notes:   Ava Paulson LPN  6/25/2018  8:52 AM  Signed  He has had diarrhea off and on and blood in his stools for awhile now. He just has a colonoscopy 3-4 months ago. He has eliminated milk and his symptoms are better.  Ava Paulson LPN..................6/25/2018   8:39 AM         SUBJECTIVE:  Ha Jacome  is a 84 year old male who comes in today because of ongoing diarrhea.  He has had this off and on with some blood in his stools for a while.  He had colonoscopy with the GI specialist 3-4 months ago which was essentially normal.  He had some diverticula and internal hemorrhoids were noted.  There was no evidence of microscopic colitis and the entire colon was otherwise normal.  He also has a history of Ortega's and EGD was normal.  He has eliminated milk and his symptoms are better.    He wears a corset and it helps his back pain significantly. He will have some leakage from the rectum from time to time.     He has used some occasional Tylenol #3. He would like a refill of same.     He has had prostatitis recently.  He had been on Cipro and he changed to Bactrim and Myrbetric and is better. He had urgency, urge incontinence and frequency.  He had a bout a month ago and took bactrim recently.  He was on Cialis 5 mg per day which improved nocturia from 4-5 and 1-2.  He had some conjunctivitis and stopped it.  He has gotten a prescription for [employer]. He continues on Flomax and Hytrin. He stopped enalapril.     Past Medical, Family, and Social History reviewed and updated as noted below.   ROS is negative except as noted above       Allergies   Allergen Reactions     Food Nausea     Mussels - diarrhea, vomiting     Chlorpheniramine      Other reaction(s): Runny Nose   ,   Family History   Problem Relation Age of Onset     Cancer Father      Cancer, prostate cancer in his late 70's treatment with radiation therapy.     HEART DISEASE Father      Heart Disease     Genetic Disorder Other       Genetic,Mother   in her mid 80's of adrenal crisis after steroid dependent rheumatoid arthritis and acute illness.  -Father   at age 94.  He had carotid disease, prostate cancer in his late 70's treatment with radiation therapy.   ,   Current Outpatient Prescriptions   Medication     acetaminophen-codeine (TYLENOL #3) 300-30 MG per tablet     loperamide (IMODIUM) 2 MG capsule     Tadalafil (CIALIS PO)     tadalafil (CIALIS) 5 MG tablet     Cholecalciferol (VITAMIN D3) 1000 UNITS CAPS     enalapril (VASOTEC) 10 MG tablet     fluorouracil (EFUDEX) 5 % cream     hydrochlorothiazide (HYDRODIURIL) 50 MG tablet     latanoprost (XALATAN) 0.005 % ophthalmic solution     Magnesium Oxide 250 MG TABS     potassium chloride SA (K-DUR/KLOR-CON M) 10 MEQ CR tablet     RABEprazole (ACIPHEX) 20 MG EC tablet     tamsulosin (FLOMAX) 0.4 MG capsule     terazosin (HYTRIN) 2 MG capsule     terazosin (HYTRIN) 5 MG capsule     No current facility-administered medications for this visit.    ,   Past Medical History:   Diagnosis Date     Essential (primary) hypertension     No Comments Provided     Gilbert's syndrome     No Comments Provided   ,   Patient Active Problem List    Diagnosis Date Noted     Chronic prostatitis 2018     Priority: Medium     Glaucoma 2018     Priority: Medium     Hypertension 2018     Priority: Medium     Benign prostatic hypertrophy 2018     Priority: Medium     Osteoarthrosis 2018     Priority: Medium     Actinic keratoses 2013     Priority: Medium     Gilbert's syndrome 2013     Priority: Medium   ,   Past Surgical History:   Procedure Laterality Date     ARTHROPLASTY KNEE      ,right     COLONOSCOPY      2016,normal, Dr. Brandt [facility]     CYSTOSCOPY      2015,Ming Chou MD  normal     EXTRACAPSULAR CATARACT EXTRATION WITH INTRAOCULAR LENS IMPLANT      18,right     HERNIORRHAPHY INGUINAL BILATERAL      09,laparoscopic     OTHER  SURGICAL HISTORY      2007,[id number],OTHER,left     OTHER SURGICAL HISTORY      2008,[id number],OTHER     OTHER SURGICAL HISTORY      9/8/08,[id number],OTHER,Deep Smith MD [place]     OTHER SURGICAL HISTORY      9/2010,[id number],OTHER,Deep Smith MD [place]     OTHER SURGICAL HISTORY      11/30/2016,[id number],ESOPHAGOGASTRODUODENOSCOPY,Normal. No further follow up for Ortega's needed. Dr. Brandt    and   Social History   Substance Use Topics     Smoking status: Never Smoker     Smokeless tobacco: Never Used     Alcohol use 3.5 oz/week     OBJECTIVE:  /82 (BP Location: Right arm, Patient Position: Sitting, Cuff Size: Adult Regular)  Pulse 60  Temp 96.7  F (35.9  C) (Tympanic)  Wt 183 lb (83 kg)  BMI 29.09 kg/m2   EXAM:  Alert cooperative, no distress.  Affect is broad ranging and appropriate.  ASSESSMENT/Plan :    Ha was seen today for diarrhea.    Diagnoses and all orders for this visit:    Diarrhea, unspecified type    Osteoarthritis, unspecified osteoarthritis type, unspecified site  -     acetaminophen-codeine (TYLENOL #3) 300-30 MG per tablet; Max acetaminophen dose: 4000mg in 24 hrs.TAKE 1-2 TABLETS BY MOUTH EVERY 4 HOURS AS NEEDED. Y    Benign prostatic hyperplasia with lower urinary tract symptoms, symptom details unspecified  -     tadalafil (CIALIS) 5 MG tablet; Take 1 tablet (5 mg) by mouth daily Do not use with nitroglycerin, terazosin or doxazosin.    Chronic prostatitis    Essential hypertension      It sounds as if he may have some lactose intolerance.  We discussed using the lactase enzyme as well as avoiding milk products.    Renewal of Cialis for printed prescription that he can send to [facility] for cost reasons.  This helps with his chronic prostatitis.  Reviewed the fact that he is on a couple other alpha blockers and that he has stopped his ACE inhibitor and his blood pressure stayed okay.  This seems reasonable.    Renewal on Tylenol 3 that he takes for osteoarthritis.  Rx for #60  with 1 refill.    Has some minor rectal leakage which really only occurs when wearing the corset that he uses for back support.  He likely has some sphincter incompetence after his back surgery.  Discussed referral to PT for pelvic floor strengthening and biofeedback and he might be interested in that at some point in the future.      A total of 15 minutes was spent with the patient, greater than 50% of the time was spent in counseling/discussion of the aforementioned concerns.       Garry Alberts MD

## 2018-06-25 NOTE — MR AVS SNAPSHOT
After Visit Summary   6/25/2018    Ha Jacome    MRN: [id number]           Patient Information     Date Of Birth          2/2/1934        Visit Information        Provider Department      6/25/2018 8:00 AM Garry Alberts MD [facility]        Today's Diagnoses     Diarrhea, unspecified type    -  1    Osteoarthritis, unspecified osteoarthritis type, unspecified site        Benign prostatic hyperplasia with lower urinary tract symptoms, symptom details unspecified        Chronic prostatitis        Essential hypertension        Incontinence of feces, unspecified fecal incontinence type           Follow-ups after your visit        Who to contact     If you have questions or need follow up information about today's clinic visit or your schedule please contact [facility] directly at [phone].  Normal or non-critical lab and imaging results will be communicated to you by MyChart, letter or phone within 4 business days after the clinic has received the results. If you do not hear from us within 7 days, please contact the clinic through MyChart or phone. If you have a critical or abnormal lab result, we will notify you by phone as soon as possible.  Submit refill requests through [employer] or call your pharmacy and they will forward the refill request to us. Please allow 3 business days for your refill to be completed.          Additional Information About Your Visit        Care EveryWhere ID     This is your Care EveryWhere ID. This could be used by other organizations to access your [facility] medical records  [id number]        Your Vitals Were     Pulse Temperature BMI (Body Mass Index)             60 96.7  F (35.9  C) (Tympanic) 29.09 kg/m2          Blood Pressure from Last 3 Encounters:   06/25/18 134/82   08/30/17 (!) 138/92   03/02/17 138/70    Weight from Last 3 Encounters:   06/25/18 183 lb (83 kg)   08/30/17 184 lb (83.5 kg)   03/02/17 188 lb  (85.3 kg)              Today, you had the following     No orders found for display         Today's Medication Changes          These changes are accurate as of 6/25/18 11:15 AM.  If you have any questions, ask your nurse or doctor.               These medicines have changed or have updated prescriptions.        Dose/Directions    acetaminophen-codeine 300-30 MG per tablet   Commonly known as:  TYLENOL #3   This may have changed:  See the new instructions.   Used for:  Osteoarthritis, unspecified osteoarthritis type, unspecified site        Max acetaminophen dose: 4000mg in 24 hrs.TAKE 1-2 TABLETS BY MOUTH EVERY 4 HOURS AS NEEDED. Y   Quantity:  60 tablet   Refills:  1       * CIALIS PO   This may have changed:  Another medication with the same name was added. Make sure you understand how and when to take each.        Dose:  5 mg   Take 5 mg by mouth daily   Refills:  0       * tadalafil 5 MG tablet   Commonly known as:  CIALIS   This may have changed:  You were already taking a medication with the same name, and this prescription was added. Make sure you understand how and when to take each.   Used for:  Benign prostatic hyperplasia with lower urinary tract symptoms, symptom details unspecified        Dose:  5 mg   Take 1 tablet (5 mg) by mouth daily Do not use with nitroglycerin, terazosin or doxazosin.   Quantity:  90 tablet   Refills:  11       * Notice:  This list has 2 medication(s) that are the same as other medications prescribed for you. Read the directions carefully, and ask your doctor or other care provider to review them with you.         Where to get your medicines      Some of these will need a paper prescription and others can be bought over the counter.  Ask your nurse if you have questions.     Bring a paper prescription for each of these medications     acetaminophen-codeine 300-30 MG per tablet    tadalafil 5 MG tablet               Information about OPIOIDS     PRESCRIPTION OPIOIDS: WHAT YOU  NEED TO KNOW   We gave you an opioid (narcotic) pain medicine. It is important to manage your pain, but opioids are not always the best choice. You should first try all the other options your care team gave you. Take this medicine for as short a time (and as few doses) as possible.     These medicines have risks:    DO NOT drive when on new or higher doses of pain medicine. These medicines can affect your alertness and reaction times, and you could be arrested for driving under the influence (DUI). If you need to use opioids long-term, talk to your care team about driving.    DO NOT operate heave machinery    DO NOT do any other dangerous activities while taking these medicines.     DO NOT drink any alcohol while taking these medicines.      If the opioid prescribed includes acetaminophen, DO NOT take with any other medicines that contain acetaminophen. Read all labels carefully. Look for the word  acetaminophen  or  Tylenol.  Ask your pharmacist if you have questions or are unsure.    You can get addicted to pain medicines, especially if you have a history of addiction (chemical, alcohol or substance dependence). Talk to your care team about ways to reduce this risk.    Store your pills in a secure place, locked if possible. We will not replace any lost or stolen medicine. If you don t finish your medicine, please throw away (dispose) as directed by your pharmacist. The [place] Pollution Control Agency has more information about safe disposal: https://www.pca.[facility].[place].us/living-green/managing-unwanted-medications.     All opioids tend to cause constipation. Drink plenty of water and eat foods that have a lot of fiber, such as fruits, vegetables, prune juice, apple juice and high-fiber cereal. Take a laxative (Miralax, milk of magnesia, Colace, Senna) if you don t move your bowels at least every other day.          Primary Care Provider Office Phone # Fax #    Garry Alberts [phone]        [address] [facility] [address]        Equal Access to Services     HECTOR DANIELSONMICHEAL : Hadii princess cat radha Aroraali, wakalebda luerasmoroelha, qariyata kajesicashabbir knottgloriashabbir, ramila greenbergyannbob garvey. So [facility] [phone].    ATENCIÓN: Si habla español, tiene a brandt disposición servicios gratuitos de asistencia lingüística. Llame al [phone].    We comply with applicable federal civil rights laws and [place] laws. We do not discriminate on the basis of race, color, national origin, age, disability, sex, sexual orientation, or gender identity.            Thank you!     Thank you for choosing [facility] AND hospitals  for your care. Our goal is always to provide you with excellent care. Hearing back from our patients is one way we can continue to improve our services. Please take a few minutes to complete the written survey that you may receive in the mail after your visit with us. Thank you!             Your Updated Medication List - Protect others around you: Learn how to safely use, store and throw away your medicines at www.disposemymeds.org.          This list is accurate as of 6/25/18 11:15 AM.  Always use your most recent med list.                   Brand Name Dispense Instructions for use Diagnosis    acetaminophen-codeine 300-30 MG per tablet    TYLENOL #3    60 tablet    Max acetaminophen dose: 4000mg in 24 hrs.TAKE 1-2 TABLETS BY MOUTH EVERY 4 HOURS AS NEEDED. Y    Osteoarthritis, unspecified osteoarthritis type, unspecified site       * CIALIS PO      Take 5 mg by mouth daily        * tadalafil 5 MG tablet    CIALIS    90 tablet    Take 1 tablet (5 mg) by mouth daily Do not use with nitroglycerin, terazosin or doxazosin.    Benign prostatic hyperplasia with lower urinary tract symptoms, symptom details unspecified       enalapril 10 MG tablet    VASOTEC     Take 1 tablet by mouth daily        fluorouracil 5 % cream    EFUDEX          hydrochlorothiazide 50 MG tablet    HYDRODIURIL      Take 50 mg by mouth daily        latanoprost 0.005 % ophthalmic solution    XALATAN          loperamide 2 MG capsule    IMODIUM     Take 2 mg by mouth 4 times daily as needed for diarrhea        Magnesium Oxide 250 MG Tabs      Take 250 mg by mouth daily        potassium chloride SA 10 MEQ CR tablet    K-DUR/KLOR-CON M     Take 20 mEq by mouth 2 times daily (with meals)        RABEprazole 20 MG EC tablet    ACIPHEX     Take 20 mg by mouth daily        tamsulosin 0.4 MG capsule    FLOMAX     Take 0.8 mg by mouth every 12 hours        * terazosin 5 MG capsule    HYTRIN     Take 5 mg by mouth At Bedtime        * terazosin 2 MG capsule    HYTRIN     Take 2 mg by mouth At Bedtime Take along with 5 mg tablet        vitamin D3 1000 units Caps      Take 1,000 Units by mouth daily        * Notice:  This list has 4 medication(s) that are the same as other medications prescribed for you. Read the directions carefully, and ask your doctor or other care provider to review them with you.

## 2018-06-25 NOTE — NURSING NOTE
He has had diarrhea off and on and blood in his stools for awhile now. He just has a colonoscopy 3-4 months ago. He has eliminated milk and his symptoms are better.  Ava Paulson LPN..................6/25/2018   8:39 AM

## 2018-07-23 NOTE — PROGRESS NOTES
Patient Information     Patient Name  Ha Jacome MRN  [id number] Sex  Male   1934      Letter by Garry Alberts MD at      Author:  Garry Alberts MD Service:  (none) Author Type:  (none)    Filed:   Encounter Date:  2017 Status:  (Other)           Ha Jacome MD  [address] [facility] [address]          2017    Dear Patrick:    Your labs look fine.  I sent the referral to see Can Zhang MD the sleep specialist.  I'll be interested to see if that is the answer.  Keep me posted.    It was a pleasure seeing you the other day.  If you have any questions, please don't hesitate to call us.     Sincerely,          Garry Alberts MD                                            Results for orders placed or performed in visit on 17      T4,FREE      Result  Value Ref Range    T4,FREE 1.02 0.58 - 1.64 ng/dL   TSH      Result  Value Ref Range    TSH 1.50 0.34 - 5.60 uIU/mL   COMP METABOLIC PANEL      Result  Value Ref Range    SODIUM 142 133 - 143 mmol/L    POTASSIUM 4.3 3.5 - 5.1 mmol/L    CHLORIDE 105 98 - 107 mmol/L    CO2,TOTAL 27 21 - 31 mmol/L    ANION GAP 10 5 - 18                    GLUCOSE 102 70 - 105 mg/dL    CALCIUM 10.0 8.6 - 10.3 mg/dL    BUN 25 7 - 25 mg/dL    CREATININE 1.23 0.70 - 1.30 mg/dL    BUN/CREAT RATIO           20                    GFR if African American >60 >60 ml/min/1.73m2    GFR if not  56 (L) >60 ml/min/1.73m2    ALBUMIN 4.1 3.5 - 5.7 g/dL    PROTEIN,TOTAL 6.8 6.4 - 8.9 g/dL    GLOBULIN                  2.7 2.0 - 3.7 g/dL    A/G RATIO 1.5 1.0 - 2.0                    BILIRUBIN,TOTAL 1.0 0.3 - 1.0 mg/dL    ALK PHOSPHATASE 73 34 - 104 IU/L    ALT (SGPT) 24 7 - 52 IU/L    AST (SGOT) 23 13 - 39 IU/L   CBC WITH AUTO DIFFERENTIAL      Result  Value Ref Range    WHITE BLOOD COUNT         5.6 4.5 - 11.0 thou/cu mm    RED BLOOD COUNT           4.31 4.30 - 5.90 mil/cu mm    HEMOGLOBIN                15.0 13.5 - 17.5 g/dL     HEMATOCRIT                43.3 37.0 - 53.0 %    MCV                       101 (H) 80 - 100 fL    MCH                       34.8 (H) 26.0 - 34.0 pg    MCHC                      34.6 32.0 - 36.0 g/dL    RDW                       14.4 11.5 - 15.5 %    PLATELET COUNT            181 140 - 440 thou/cu mm    MPV                       9.9 6.5 - 11.0 fL    NEUTROPHILS               55.3 42.0 - 72.0 %    LYMPHOCYTES               28.4 20.0 - 44.0 %    MONOCYTES                 8.0 <12.0 %    EOSINOPHILS               6.7 <8.0 %    BASOPHILS                 1.4 <3.0 %    IMMATURE GRANULOCYTES(METAS,MYELOS,PROS) 0.2 %    ABSOLUTE NEUTROPHILS      3.1 1.7 - 7.0 thou/cu mm    ABSOLUTE LYMPHOCYTES      1.6 0.9 - 2.9 thou/cu mm    ABSOLUTE MONOCYTES        0.5 <0.9 thou/cu mm    ABSOLUTE EOSINOPHILS      0.4 <0.5 thou/cu mm    ABSOLUTE BASOPHILS        0.1 <0.3 thou/cu mm    ABSOLUTE IMMATURE GRANULOCYTES(METAS,MYELOS,PROS) 0.0 <=0.3 thou/cu mm

## 2018-07-23 NOTE — PROGRESS NOTES
Patient Information     Patient Name  Ha Jacome MRN  [id number] Sex  Male   1934      Letter by Garry Alberts MD at      Author:  Garry Alberts MD Service:  (none) Author Type:  (none)    Filed:   Encounter Date:  3/2/2017 Status:  (Other)           Ha Jacome  [address] [facility] [place] [address]          March 3, 2017    Dear Patrick:    Your labs look fine.  Your glucose is a bit up, but not in a worrisome range, so we will just trend this over time. On your CBC, your MCV is slightly elevated.  This is sometimes seen with vitamin B 12 deficiency, so I added that to your labs, but it is not back yet.  Everything looks good otherwise.  The testosterone gets sent to the reference lab and is not back yet and I'll let you know those results when available.      It was a pleasure seeing you the other day.  If you have any questions, please don't hesitate to call us.     Sincerely,          Garry Alberts MD                                        Results for orders placed or performed in visit on 17      COMP METABOLIC PANEL      Result  Value Ref Range    SODIUM 142 133 - 143 mmol/L    POTASSIUM 4.2 3.5 - 5.1 mmol/L    CHLORIDE 105 98 - 107 mmol/L    CO2,TOTAL 26 21 - 31 mmol/L    ANION GAP 11 5 - 18                    GLUCOSE 119 (H) 70 - 105 mg/dL    CALCIUM 10.0 8.6 - 10.3 mg/dL    BUN 20 7 - 25 mg/dL    CREATININE 1.25 0.70 - 1.30 mg/dL    BUN/CREAT RATIO           16                    GFR if African American >60 >60 ml/min/1.73m2    GFR if not African American 55 (L) >60 ml/min/1.73m2    ALBUMIN 4.2 3.5 - 5.7 g/dL    PROTEIN,TOTAL 7.0 6.4 - 8.9 g/dL    GLOBULIN                  2.8 2.0 - 3.7 g/dL    A/G RATIO 1.5 1.0 - 2.0                    BILIRUBIN,TOTAL 1.0 0.3 - 1.0 mg/dL    ALK PHOSPHATASE 68 34 - 104 IU/L    ALT (SGPT) 21 7 - 52 IU/L    AST (SGOT) 23 13 - 39 IU/L   PSA TOTAL (DIAGNOSTIC)      Result  Value Ref Range    PSA TOTAL (DIAGNOSTIC) 2.398 <=3.100 ng/mL   LIPID  PANEL      Result  Value Ref Range    CHOLESTEROL,TOTAL 171 <200 mg/dL    TRIGLYCERIDES 45 <150 mg/dL    HDL CHOLESTEROL 54 23 - 92 mg/dL    NON-HDL CHOLESTEROL 117 <145 mg/dl    CHOL/HDL RATIO            3.17 <4.50                    LDL CHOLESTEROL 108 (H) <100 mg/dL    PATIENT STATUS            FASTING                   URIC ACID      Result  Value Ref Range    URIC ACID 6.1 4.4 - 7.6 mg/dL   CBC WITH AUTO DIFFERENTIAL      Result  Value Ref Range    WHITE BLOOD COUNT         5.4 4.5 - 11.0 thou/cu mm    RED BLOOD COUNT           4.79 4.30 - 5.90 mil/cu mm    HEMOGLOBIN                16.2 13.5 - 17.5 g/dL    HEMATOCRIT                48.5 37.0 - 53.0 %    MCV                       101 (H) 80 - 100 fL    MCH                       33.7 26.0 - 34.0 pg    MCHC                      33.3 32.0 - 36.0 g/dL    RDW                       13.7 11.5 - 15.5 %    PLATELET COUNT            195 140 - 440 thou/cu mm    MPV                       7.7 6.5 - 11.0 fL    NEUTROPHILS               56.1 42.0 - 72.0 %    LYMPHOCYTES               28.8 20.0 - 44.0 %    MONOCYTES                 7.0 <12.0 %    EOSINOPHILS               7.1 <8.0 %    BASOPHILS                 1.0 <3.0 %    ABSOLUTE NEUTROPHILS      3.0 1.7 - 7.0 thou/cu mm    ABSOLUTE LYMPHOCYTES      1.6 0.9 - 2.9 thou/cu mm    ABSOLUTE MONOCYTES        0.4 <0.9 thou/cu mm    ABSOLUTE EOSINOPHILS      0.4 <0.5 thou/cu mm    ABSOLUTE BASOPHILS        0.1 <0.3 thou/cu mm

## 2019-01-24 ENCOUNTER — TELEPHONE (OUTPATIENT)
Dept: FAMILY MEDICINE | Facility: OTHER | Age: 84
End: 2019-01-24

## 2019-01-24 DIAGNOSIS — Z01.812 PRE-PROCEDURE LAB EXAM: ICD-10-CM

## 2019-01-24 DIAGNOSIS — R31.0 GROSS HEMATURIA: Primary | ICD-10-CM

## 2019-01-24 NOTE — TELEPHONE ENCOUNTER
The patient had a large amount of gross hematuria yesterday. He does have an appointment with you on January 31 st but would like a CT Urogram ordered before his appointment.  Ava Paulson LPN..................1/24/2019   12:07 PM

## 2019-01-24 NOTE — TELEPHONE ENCOUNTER
After the patient's name and date of birth was verified, the patient was told the below information.  Ava Paulson LPN..................1/24/2019   2:18 PM

## 2019-01-25 ENCOUNTER — HOSPITAL ENCOUNTER (OUTPATIENT)
Dept: CT IMAGING | Facility: OTHER | Age: 84
Discharge: HOME | End: 2019-01-25
Attending: FAMILY MEDICINE | Admitting: FAMILY MEDICINE
Payer: MEDICARE

## 2019-01-25 DIAGNOSIS — R31.0 GROSS HEMATURIA: ICD-10-CM

## 2019-01-25 DIAGNOSIS — Z01.812 PRE-PROCEDURE LAB EXAM: ICD-10-CM

## 2019-01-25 LAB
CREAT SERPL-MCNC: 1.33 MG/DL (ref 0.7–1.3)
GFR SERPL CREATININE-BSD FRML MDRD: 51 ML/MIN/{1.73_M2}

## 2019-01-25 PROCEDURE — 74178 CT ABD&PLV WO CNTR FLWD CNTR: CPT

## 2019-01-25 PROCEDURE — 82565 ASSAY OF CREATININE: CPT | Performed by: FAMILY MEDICINE

## 2019-01-25 PROCEDURE — 25500064 ZZH RX 255 OP 636: Performed by: FAMILY MEDICINE

## 2019-01-25 RX ORDER — IODIXANOL 320 MG/ML
100 INJECTION, SOLUTION INTRAVASCULAR ONCE
Status: COMPLETED | OUTPATIENT
Start: 2019-01-25 | End: 2019-01-25

## 2019-01-25 RX ADMIN — IODIXANOL 100 ML: 320 INJECTION, SOLUTION INTRAVASCULAR at 14:59

## 2019-01-31 ENCOUNTER — OFFICE VISIT (OUTPATIENT)
Dept: FAMILY MEDICINE | Facility: OTHER | Age: 84
End: 2019-01-31
Attending: FAMILY MEDICINE
Payer: MEDICARE

## 2019-01-31 VITALS
TEMPERATURE: 98.2 F | SYSTOLIC BLOOD PRESSURE: 132 MMHG | HEART RATE: 64 BPM | DIASTOLIC BLOOD PRESSURE: 80 MMHG | WEIGHT: 186.4 LBS | RESPIRATION RATE: 18 BRPM | BODY MASS INDEX: 29.64 KG/M2

## 2019-01-31 DIAGNOSIS — I10 ESSENTIAL HYPERTENSION: ICD-10-CM

## 2019-01-31 DIAGNOSIS — M47.816 OSTEOARTHRITIS OF LUMBAR SPINE, UNSPECIFIED SPINAL OSTEOARTHRITIS COMPLICATION STATUS: ICD-10-CM

## 2019-01-31 DIAGNOSIS — N40.1 BENIGN PROSTATIC HYPERPLASIA WITH URINARY FREQUENCY: ICD-10-CM

## 2019-01-31 DIAGNOSIS — N41.1 CHRONIC PROSTATITIS: ICD-10-CM

## 2019-01-31 DIAGNOSIS — R35.0 BENIGN PROSTATIC HYPERPLASIA WITH URINARY FREQUENCY: ICD-10-CM

## 2019-01-31 DIAGNOSIS — R31.0 GROSS HEMATURIA: Primary | ICD-10-CM

## 2019-01-31 DIAGNOSIS — K86.89 ATROPHY, PANCREAS: ICD-10-CM

## 2019-01-31 DIAGNOSIS — M53.3 SACROILIAC JOINT DYSFUNCTION: ICD-10-CM

## 2019-01-31 PROBLEM — R39.12 BENIGN PROSTATIC HYPERPLASIA WITH WEAK URINARY STREAM: Status: ACTIVE | Noted: 2018-01-31

## 2019-01-31 PROCEDURE — G0463 HOSPITAL OUTPT CLINIC VISIT: HCPCS

## 2019-01-31 PROCEDURE — 99214 OFFICE O/P EST MOD 30 MIN: CPT | Performed by: FAMILY MEDICINE

## 2019-01-31 ASSESSMENT — PAIN SCALES - GENERAL: PAINLEVEL: NO PAIN (0)

## 2019-01-31 NOTE — PROGRESS NOTES
"Nursing Notes:   Ava Paulson, LPN  1/31/2019  8:24 AM  Signed  Chief Complaint   Patient presents with     RECHECK     after CT urogram       Initial /80   Pulse 64   Temp 98.2  F (36.8  C) (Temporal)   Resp 18   Wt 84.6 kg (186 lb 6.4 oz)   BMI 29.64 kg/m    Estimated body mass index is 29.64 kg/m  as calculated from the following:    Height as of 3/2/17: 1.689 m (5' 6.5\").    Weight as of this encounter: 84.6 kg (186 lb 6.4 oz).  Medication Reconciliation: complete    Ava Paulson LPN    SUBJECTIVE:  Ha Jacome  is a 84 year old male who comes in today for gross hematuria. He thinks it came from the prostate.  He had no clots.   He called last week because he had gross hematuria and asked that we order a CT urogram.  That was negative with the exception of marked prostatomegaly.  He had a PSA 2 years ago that was 2.4.  He continues on daily Cialis in addition to Flomax and Hytrin.  He is still taking enalapril.    He had some fatty atrophy of the pancreas. He wonders about risk for cancer or diabetes.    He had a severe bout with prostatitis about a year ago. He was on Cipro and was incontinent and had urgency.  About 10 days ago he had some mild urgency.  He has an appointment in [place] with a [facility] urologist.     He has a new issue with his back.  He has sudden severe pain in the left low back.  It is incapacitating, but it will go away relatively quickly.  It is over the left SI and it is positional. He has a spondylolisthesis, and his back surgery essentially left him with hypermobility.  He has done well with a corset when walking.    Past Medical, Family, and Social History reviewed and updated as noted below.   ROS is negative except as noted above       Allergies   Allergen Reactions     Food Nausea     Mussels - diarrhea, vomiting     Chlorpheniramine      Other reaction(s): Runny Nose   ,   Family History   Problem Relation Age of Onset     Cancer Father         Cancer, " prostate cancer in his late 70's treatment with radiation therapy.     Heart Disease Father         Heart Disease     Genetic Disorder Other         Genetic,Mother   in her mid 80's of adrenal crisis after steroid dependent rheumatoid arthritis and acute illness.  -Father   at age 94.  He had carotid disease, prostate cancer in his late 70's treatment with radiation therapy.   ,   Current Outpatient Medications   Medication     Tadalafil (CIALIS PO)     acetaminophen-codeine (TYLENOL #3) 300-30 MG per tablet     Cholecalciferol (VITAMIN D3) 1000 UNITS CAPS     enalapril (VASOTEC) 10 MG tablet     fluorouracil (EFUDEX) 5 % cream     hydrochlorothiazide (HYDRODIURIL) 50 MG tablet     latanoprost (XALATAN) 0.005 % ophthalmic solution     loperamide (IMODIUM) 2 MG capsule     Magnesium Oxide 250 MG TABS     potassium chloride SA (K-DUR/KLOR-CON M) 10 MEQ CR tablet     RABEprazole (ACIPHEX) 20 MG EC tablet     tamsulosin (FLOMAX) 0.4 MG capsule     terazosin (HYTRIN) 5 MG capsule     No current facility-administered medications for this visit.    ,   Past Medical History:   Diagnosis Date     Essential (primary) hypertension     No Comments Provided     Gilbert's syndrome     No Comments Provided   ,   Patient Active Problem List    Diagnosis Date Noted     Fatty Atrophy, pancreas 2019     Priority: Medium     Osteoarthritis of lumbar spine, unspecified spinal osteoarthritis complication status 2019     Priority: Medium     Chronic prostatitis 2018     Priority: Medium     Glaucoma 2018     Priority: Medium     Hypertension 2018     Priority: Medium     Benign prostatic hyperplasia with urinary frequency 2018     Priority: Medium     Osteoarthrosis 2018     Priority: Medium     Actinic keratoses 2013     Priority: Medium     Gilbert's syndrome 2013     Priority: Medium   ,   Past Surgical History:   Procedure Laterality Date     ARTHROPLASTY KNEE       2006,right     COLONOSCOPY      11/30/2016,normal, Dr. Brandt      CYSTOSCOPY      8/2015,Ming Chou MD  normal     EXTRACAPSULAR CATARACT EXTRATION WITH INTRAOCULAR LENS IMPLANT      12/26/18,right     HERNIORRHAPHY INGUINAL BILATERAL      1/2/09,laparoscopic     OTHER SURGICAL HISTORY      2007,[id number],OTHER,left     OTHER SURGICAL HISTORY      2008,[id number],OTHER     OTHER SURGICAL HISTORY      9/8/08,[id number],OTHER,Deep Smith MD [place], [place]     OTHER SURGICAL HISTORY      9/2010,[id number],OTHER,Deep Smith MD [place], [place]     OTHER SURGICAL HISTORY      11/30/2016,[id number],ESOPHAGOGASTRODUODENOSCOPY,Normal. No further follow up for Ortega's needed. Dr. Brandt    and   Social History     Tobacco Use     Smoking status: Never Smoker     Smokeless tobacco: Never Used   Substance Use Topics     Alcohol use: Yes     Alcohol/week: 3.5 oz     OBJECTIVE:  /80   Pulse 64   Temp 98.2  F (36.8  C) (Temporal)   Resp 18   Wt 84.6 kg (186 lb 6.4 oz)   BMI 29.64 kg/m     EXAM:  No specific SI joint tenderness.  Alert and cooperative, no distress.  We reviewed his CT with him.    CT UROGRAM WO & W CONTRAST     HISTORY: Hematuria, unknown cause; Gross hematuria     TECHNIQUE:  Helical CT of the abdomen and pelvis was performed before  and during the nephrographic and urographic phases following injection  of intravenous contrast.      COMPARISON:  None.   MEDS/CONTRAST: 100 ml Visipaque 320     FINDINGS:  Limited images through the lung bases demonstrate scarring or minimal  fibrosis at the bases. No pericardial or pleural effusion is seen.     Precontrast imaging demonstrates no renal or ureteral calculi.  Nephrographic phase imaging demonstrates parapelvic cysts on the left.  Delayed imaging demonstrates normal ureteral opacification. The  bladder is within normal limits. The prostate measures 6 cm.     The appearance of the liver, spleen, and adrenal glands is  unremarkable.  Sludge or small stones are  noted in the gallbladder  dependently. There is fatty atrophy of the pancreas. The aorta is  normal in size with scattered atherosclerotic calcifications. The  bowel is normal in caliber.     No free fluid, free air or adenopathy is present. Advanced  degenerative changes are present in the lumbar spine, associated with  prior multilevel laminectomy without surgical fusion.     IMPRESSION:    Marked prostatomegaly. No other finding to account for hematuria.     XIMENA HAMMER MD    ASSESSMENT/Plan :    Ha was seen today for recheck.    Diagnoses and all orders for this visit:    Gross hematuria    Sacroiliac joint dysfunction  -     PHYSICAL THERAPY REFERRAL; Future    Essential hypertension    Chronic prostatitis    Benign prostatic hyperplasia with urinary frequency    Osteoarthritis of lumbar spine, unspecified spinal osteoarthritis complication status    Fatty Atrophy, pancreas      Reassured about CT.  Reviewed images with him.  He has an appointment set up for cystoscopy.  Discussed his enlarged prostate but taking his alpha blockers and Cialis and seems to have mitigated his frequency and he has a decent stream.  He is not interested in having anything done surgically.  It is highly likely that his prostate was the cause of his gross hematuria.    He has a fairly unstable spine after his spine surgery which is why the course that is helpful for him.  It is likely that this is the cause of his sharp pain but also seems to localize over his SI joint so he is referred for physical therapy.    Reviewed his scan with Dr. Hammer.  Given the fact that he has no ductal dilatation, the fatty atrophy is not concerning at his age.  It is interesting that he has had intermittent diarrhea for many years and it is possible that he may have had some low-grade pancreatitis that may have resulted in this condition.  There are no recommendations as far as other screening going forward.  He was reassured that drinking  his 2 glasses of wine each evening is unlikely though affect his pancreas.  We discussed the possibility of pancreatic supplementation if his diarrhea would return to see if that would mitigate his symptoms.  We will continue to monitor enzymatically from time to time and also keep track of his blood sugars.    A total of 25 minutes was spent with the patient, greater than 50% of the time was spent in counseling/discussion of the aforementioned concerns.     Garry Alberts MD

## 2019-01-31 NOTE — NURSING NOTE
"Chief Complaint   Patient presents with     RECHECK     after CT urogram       Initial /80   Pulse 64   Temp 98.2  F (36.8  C) (Temporal)   Resp 18   Wt 84.6 kg (186 lb 6.4 oz)   BMI 29.64 kg/m   Estimated body mass index is 29.64 kg/m  as calculated from the following:    Height as of 3/2/17: 1.689 m (5' 6.5\").    Weight as of this encounter: 84.6 kg (186 lb 6.4 oz).  Medication Reconciliation: complete    Ava Paulson LPN  "

## 2019-01-31 NOTE — LETTER
"January 31, 2019      Ha Jacome  [address]  [place] [address]        Dear Patrick,     I talked to Yadiel Lopez our radiologist.  He felt that your pancreas looked fine.  He said that if they don't see any ductal dilation (which you don't have), it is highly unlikely that there is any significant pathology there.  He said that the risk of pancreatic neoplasm associated with the fatty atrophy was more of an issue if this was noted in someone in their 20's or 30's.  He felt you had a \"very quiet and reassuring abdomen\" for your young age (with the exception of your generous prostate!).     I have yet to connect with Dr. Colindres our oncologist, but when I do, if he has any suggestions otherwise I'll let you know.    It was a pleasure seeing you the other day.  If you have any questions, please don't hesitate to call us.       Sincerely,        Garry Alberts MD          "

## 2019-02-13 ENCOUNTER — TRANSFERRED RECORDS (OUTPATIENT)
Dept: HEALTH INFORMATION MANAGEMENT | Facility: OTHER | Age: 84
End: 2019-02-13

## 2019-02-26 ENCOUNTER — TELEPHONE (OUTPATIENT)
Dept: FAMILY MEDICINE | Facility: OTHER | Age: 84
End: 2019-02-26

## 2019-02-26 NOTE — TELEPHONE ENCOUNTER
Patient would like a work in for personal reasons. Patient does not want to wait for next available appointment .

## 2019-02-26 NOTE — TELEPHONE ENCOUNTER
The patient was given an appointment tomorrow.  Ava Paulson LPN..................2/26/2019   10:16 AM

## 2019-02-27 ENCOUNTER — OFFICE VISIT (OUTPATIENT)
Dept: FAMILY MEDICINE | Facility: OTHER | Age: 84
End: 2019-02-27
Attending: FAMILY MEDICINE
Payer: MEDICARE

## 2019-02-27 VITALS
HEART RATE: 74 BPM | SYSTOLIC BLOOD PRESSURE: 126 MMHG | BODY MASS INDEX: 29.73 KG/M2 | WEIGHT: 187 LBS | DIASTOLIC BLOOD PRESSURE: 78 MMHG | TEMPERATURE: 98 F | RESPIRATION RATE: 20 BRPM

## 2019-02-27 DIAGNOSIS — T78.40XA ALLERGIC REACTION, INITIAL ENCOUNTER: Primary | ICD-10-CM

## 2019-02-27 DIAGNOSIS — M10.9 GOUT, UNSPECIFIED CAUSE, UNSPECIFIED CHRONICITY, UNSPECIFIED SITE: ICD-10-CM

## 2019-02-27 PROCEDURE — G0463 HOSPITAL OUTPT CLINIC VISIT: HCPCS

## 2019-02-27 PROCEDURE — 99213 OFFICE O/P EST LOW 20 MIN: CPT | Performed by: FAMILY MEDICINE

## 2019-02-27 RX ORDER — ALLOPURINOL 100 MG/1
100 TABLET ORAL DAILY
Qty: 30 TABLET | COMMUNITY
Start: 2019-02-27 | End: 2019-04-25

## 2019-02-27 RX ORDER — EPINEPHRINE 0.3 MG/.3ML
0.3 INJECTION SUBCUTANEOUS PRN
Qty: 0.6 ML | Refills: 3 | Status: SHIPPED | OUTPATIENT
Start: 2019-02-27

## 2019-02-27 NOTE — PROGRESS NOTES
There are no exam notes on file for this visit.    SUBJECTIVE:  Ha Jacome  is a 85 year old male who comes in today because of a few concerns.    He had a previous reaction to mussels years ago that lasted for several hours.  Had GI symptoms and muscle cramps.  It happened again, so he has avoided it.  He had some mild exposure to them without eating them recently. He had shaking chills and cramps again.   He had a recent reaction to some tuna.  He has had no rash, shortness of breath.  Primary symptoms are chills, headache and muscle aches.     Past Medical, Family, and Social History reviewed and updated as noted below.   ROS is negative except as noted above       Allergies   Allergen Reactions     Food Nausea     Mussels - diarrhea, vomiting     Chlorpheniramine      Other reaction(s): Runny Nose   ,   Family History   Problem Relation Age of Onset     Cancer Father         Cancer, prostate cancer in his late 70's treatment with radiation therapy.     Heart Disease Father         Heart Disease     Genetic Disorder Other         Genetic,Mother   in her mid 80's of adrenal crisis after steroid dependent rheumatoid arthritis and acute illness.  -Father   at age 94.  He had carotid disease, prostate cancer in his late 70's treatment with radiation therapy.   ,   Current Outpatient Medications   Medication     acetaminophen-codeine (TYLENOL #3) 300-30 MG per tablet     allopurinol (ZYLOPRIM) 100 MG tablet     Cholecalciferol (VITAMIN D3) 1000 UNITS CAPS     enalapril (VASOTEC) 10 MG tablet     EPINEPHrine (EPIPEN/ADRENACLICK/OR ANY BX GENERIC EQUIV) 0.3 MG/0.3ML injection 2-pack     fluorouracil (EFUDEX) 5 % cream     hydrochlorothiazide (HYDRODIURIL) 50 MG tablet     latanoprost (XALATAN) 0.005 % ophthalmic solution     loperamide (IMODIUM) 2 MG capsule     Magnesium Oxide 250 MG TABS     potassium chloride SA (K-DUR/KLOR-CON M) 10 MEQ CR tablet     RABEprazole (ACIPHEX) 20 MG EC tablet     Tadalafil  (CIALIS PO)     tamsulosin (FLOMAX) 0.4 MG capsule     terazosin (HYTRIN) 5 MG capsule     No current facility-administered medications for this visit.    ,   Past Medical History:   Diagnosis Date     Essential (primary) hypertension     No Comments Provided     Gilbert's syndrome     No Comments Provided   ,   Patient Active Problem List    Diagnosis Date Noted     Fatty Atrophy, pancreas 01/31/2019     Priority: Medium     Osteoarthritis of lumbar spine, unspecified spinal osteoarthritis complication status 01/31/2019     Priority: Medium     Chronic prostatitis 06/25/2018     Priority: Medium     Glaucoma 01/31/2018     Priority: Medium     Hypertension 01/31/2018     Priority: Medium     Benign prostatic hyperplasia with urinary frequency 01/31/2018     Priority: Medium     Osteoarthrosis 01/31/2018     Priority: Medium     Actinic keratoses 06/21/2013     Priority: Medium     Gilbert's syndrome 06/21/2013     Priority: Medium   ,   Past Surgical History:   Procedure Laterality Date     ARTHROPLASTY KNEE      2006,right     COLONOSCOPY      11/30/2016,normal, Dr. Brandt [facility]     CYSTOSCOPY      8/2015,Ming Chou MD  normal     EXTRACAPSULAR CATARACT EXTRATION WITH INTRAOCULAR LENS IMPLANT      12/26/18,right     HERNIORRHAPHY INGUINAL BILATERAL      1/2/09,laparoscopic     OTHER SURGICAL HISTORY      2007,[id number],OTHER,left     OTHER SURGICAL HISTORY      2008,[id number],OTHER     OTHER SURGICAL HISTORY      9/8/08,[id number],OTHER,Deep Smith MD [place], [place]     OTHER SURGICAL HISTORY      9/2010,[id number],OTHER,Deep Smith MD [place], [place]     OTHER SURGICAL HISTORY      11/30/2016,[id number],ESOPHAGOGASTRODUODENOSCOPY,Normal. No further follow up for Ortega's needed. Dr. Brandt    and   Social History     Tobacco Use     Smoking status: Never Smoker     Smokeless tobacco: Never Used   Substance Use Topics     Alcohol use: Yes     Alcohol/week: 3.5 oz     OBJECTIVE:  /78 (BP Location: Right arm, Patient  Position: Sitting, Cuff Size: Adult Regular)   Pulse 74   Temp 98  F (36.7  C) (Tympanic)   Resp 20   Wt 84.8 kg (187 lb)   BMI 29.73 kg/m     EXAM:  Alert and cooperative, no distress.  Formal exam was not done today.  ASSESSMENT/Plan :    Ha was seen today for medication problem.    Diagnoses and all orders for this visit:    Allergic reaction, initial encounter  -     EPINEPHrine (EPIPEN/ADRENACLICK/OR ANY BX GENERIC EQUIV) 0.3 MG/0.3ML injection 2-pack; Inject 0.3 mLs (0.3 mg) into the muscle as needed for anaphylaxis    Gout, unspecified cause, unspecified chronicity, unspecified site      I suspect that he may have experienced a toxin from contaminated muscles or shellfish.  Below is some information from up-to-date which we sent him a letter.    A total of 15 minutes was spent with the patient, greater than 50% of the time was spent in counseling/discussion of the aforementioned concerns.     DIARRHEIC SHELLFISH POISONING  ?Epidemiology - Diarrheic shellfish poisoning (DSP) occurs throughout the world. Major outbreaks, primarily associated with contaminated mussels, have occurred in [place], [place], [place], [place], [place], [place], [place], [place], [place], the [place], and [place]. [2,3,16-19].  ?Pathophysiology - Several toxins have been associated with DSP, of which the best known is okadaic acid [16]. It is lipid soluble and acts as an inhibitor of certain types of phosphorylation in mammalian cells.  ?Evaluation and management - A case definition of DSP would include chills and gastrointestinal symptoms such as nausea, vomiting, diarrhea, and abdominal cramps shortly after eating bivalve mollusks, especially mussels and scallops [3]. The symptoms usually occur within two hours after shellfish consumption and resolve within two to three days. The presence of okadaic acid or one of the other DSP-associate toxins confirms the diagnosis. Treatment is supportive.  Diarrhetic shellfish poisoning  "has many clinical features in common with other types of foodborne illness, including poisoning from some types of mushroom and food contamination by a variety of bacterial organisms. A food history that indicates ingestion of bivalve mollusks from a region associated with DSP and isolation of okadaic acid or one of the other associated toxins from the contaminated food helps to distinguish it from other toxins or microbial contaminants. (See \"Clinical manifestations and evaluation of mushroom poisoning\", section on 'Acute gastroenteritis' and \"Causes of acute infectious diarrhea and other foodborne illnesses in resource-rich settings\", section on 'Clinical clues to the microbial cause'.)  Management of diarrheic shellfish poisoning consists of fluid repletion and antiemetics (eg, ondansetron) for vomiting.      Garry Alberts MD            "

## 2019-02-27 NOTE — LETTER
February 27, 2019      Ha Jacome  [address]  [place] [address]        Dear Patrick,     I went into UpTo[employer] and found some information that I pasted below.  This makes me think that the EpiPen might not really be that helpful, but it is also reassuring that it is unlikely to progress to angioedema or respiratory issues.    It was a pleasure seeing you the other day.  If you have any questions, please don't hesitate to call us.       Sincerely,        Garry Alberts MD                                                  DIARRHEIC SHELLFISH POISONING  ?Epidemiology - Diarrheic shellfish poisoning (DSP) occurs throughout the world. Major outbreaks, primarily associated with contaminated mussels, have occurred in [place], [place], [place], [place], [place], [place], [place], [place], [place], the [place], and [place]. [2,3,16-19].  ?Pathophysiology - Several toxins have been associated with DSP, of which the best known is okadaic acid [16]. It is lipid soluble and acts as an inhibitor of certain types of phosphorylation in mammalian cells.  ?Evaluation and management - A case definition of DSP would include chills and gastrointestinal symptoms such as nausea, vomiting, diarrhea, and abdominal cramps shortly after eating bivalve mollusks, especially mussels and scallops [3]. The symptoms usually occur within two hours after shellfish consumption and resolve within two to three days. The presence of okadaic acid or one of the other DSP-associate toxins confirms the diagnosis. Treatment is supportive.  Diarrhetic shellfish poisoning has many clinical features in common with other types of foodborne illness, including poisoning from some types of mushroom and food contamination by a variety of bacterial organisms. A food history that indicates ingestion of bivalve mollusks from a region associated with DSP and isolation of okadaic acid or one of the other associated toxins from the contaminated food  helps to distinguish it from other toxins or microbial contaminants.    Management of diarrheic shellfish poisoning consists of fluid repletion and antiemetics (eg, ondansetron) for vomiting.

## 2019-03-06 DIAGNOSIS — K21.9 GASTROESOPHAGEAL REFLUX DISEASE WITHOUT ESOPHAGITIS: Primary | ICD-10-CM

## 2019-03-06 RX ORDER — RABEPRAZOLE SODIUM 20 MG/1
20 TABLET, DELAYED RELEASE ORAL DAILY
Qty: 100 TABLET | Refills: 4 | Status: SHIPPED | OUTPATIENT
Start: 2019-03-06

## 2019-03-06 NOTE — TELEPHONE ENCOUNTER
He would like his Rx faxed to [facility]. [phone].  Ava Paulson LPN..................3/6/2019   1:05 PM

## 2019-03-12 ENCOUNTER — OFFICE VISIT (OUTPATIENT)
Dept: UROLOGY | Facility: OTHER | Age: 84
End: 2019-03-12
Attending: UROLOGY
Payer: MEDICARE

## 2019-03-12 VITALS
RESPIRATION RATE: 16 BRPM | WEIGHT: 180 LBS | HEIGHT: 69 IN | TEMPERATURE: 98.2 F | SYSTOLIC BLOOD PRESSURE: 128 MMHG | DIASTOLIC BLOOD PRESSURE: 70 MMHG | HEART RATE: 60 BPM | OXYGEN SATURATION: 95 % | BODY MASS INDEX: 26.66 KG/M2

## 2019-03-12 DIAGNOSIS — R39.9 LOWER URINARY TRACT SYMPTOMS (LUTS): ICD-10-CM

## 2019-03-12 DIAGNOSIS — R31.0 GROSS HEMATURIA: Primary | ICD-10-CM

## 2019-03-12 PROCEDURE — 99203 OFFICE O/P NEW LOW 30 MIN: CPT | Mod: 25 | Performed by: UROLOGY

## 2019-03-12 PROCEDURE — 52000 CYSTOURETHROSCOPY: CPT | Performed by: UROLOGY

## 2019-03-12 PROCEDURE — 88108 CYTOPATH CONCENTRATE TECH: CPT | Mod: TC | Performed by: UROLOGY

## 2019-03-12 PROCEDURE — G0463 HOSPITAL OUTPT CLINIC VISIT: HCPCS | Mod: 25

## 2019-03-12 ASSESSMENT — MIFFLIN-ST. JEOR: SCORE: 1491.85

## 2019-03-12 ASSESSMENT — PAIN SCALES - GENERAL: PAINLEVEL: NO PAIN (0)

## 2019-03-12 NOTE — Clinical Note
Hany Alberts,Just GUANAKITO, I saw Dr. Jacome today. I did not find anything concerning for malignancy on cystoscopy today, however we did confirm his significant BPH. He is considering a bipolar TURP and will let me know if he would like to proceed. Please let me know if you have any questions or concerns.Thanks, Oleg Valladares M.D. Cell: [phone]

## 2019-03-12 NOTE — PROGRESS NOTES
Urology Consult History and Physical  [place]   Name: Ha Jacome    MRN: [id number]   YOB: 1934       We were asked to see Ha Jacome at the request of Dr. Alberts for evaluation and treatment of gross hematuria.        Chief Complaint:   Gross hematuria    History is obtained from the patient            History of Present Illness:   Ha Jacome is a 85 year old male who is being seen for evaluation of gross hematuria.    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 He has a long history of BPH with LUTS and is currently on tamsulosin 0.4mg (Flomax), terazosin (Hytrin) 5mg, and tadalafil (Cialis) 5mg daily for this. He noted an episode of terminal gross hematuria ~4 weeks ago. He presented to Dr. Alberts and a CT Urogram was obtained which demonstrated no evidence of upper tract issues however noted an enlarged prostate. He notes that he overall has a moderate stream with frequency and urgency. No prior history of UTIs, dysuria, bladder stones, or acute urinary retention.     (4 or >)  Location: Urine  Quality: Gross  Severity: gross   Duration: 4 weeks           Past Medical History:     Past Medical History:   Diagnosis Date     Essential (primary) hypertension     No Comments Provided     Gilbert's syndrome     No Comments Provided            Past Surgical History:     Past Surgical History:   Procedure Laterality Date     ARTHROPLASTY KNEE      2006,right     COLONOSCOPY      11/30/2016,normal, Dr. Brandt [facility]     CYSTOSCOPY      8/2015,Ming Chou MD  normal     EXTRACAPSULAR CATARACT EXTRATION WITH INTRAOCULAR LENS IMPLANT      12/26/18,right     HERNIORRHAPHY INGUINAL BILATERAL      1/2/09,laparoscopic     OTHER SURGICAL HISTORY      2007,[id number],OTHER,left     OTHER SURGICAL HISTORY      2008,[id number],OTHER     OTHER SURGICAL HISTORY      9/8/08,[id number],OTHER,Deep Smith MD [place], [place]     OTHER SURGICAL HISTORY      9/2010,[id number],OTHER,Deep Smith,  MD [place], [place]     OTHER SURGICAL HISTORY      2016,[id number],ESOPHAGOGASTRODUODENOSCOPY,Normal. No further follow up for Ortega's needed. Dr. Brandt            Social History:     Social History     Tobacco Use     Smoking status: Never Smoker     Smokeless tobacco: Never Used   Substance Use Topics     Alcohol use: Yes     Alcohol/week: 3.5 oz       History   Smoking Status     Never Smoker   Smokeless Tobacco     Never Used            Family History:     Family History   Problem Relation Age of Onset     Cancer Father         Cancer, prostate cancer in his late 70's treatment with radiation therapy.     Heart Disease Father         Heart Disease     Genetic Disorder Other         Genetic,Mother   in her mid 80's of adrenal crisis after steroid dependent rheumatoid arthritis and acute illness.  -Father   at age 94.  He had carotid disease, prostate cancer in his late 70's treatment with radiation therapy.              Allergies:     Allergies   Allergen Reactions     Food Nausea     Mussels - diarrhea, vomiting     Chlorpheniramine      Other reaction(s): Runny Nose            Medications:     Current Outpatient Medications   Medication Sig     acetaminophen-codeine (TYLENOL #3) 300-30 MG per tablet Max acetaminophen dose: 4000mg in 24 hrs.TAKE 1-2 TABLETS BY MOUTH EVERY 4 HOURS AS NEEDED. Y     allopurinol (ZYLOPRIM) 100 MG tablet Take 1 tablet (100 mg) by mouth daily     Cholecalciferol (VITAMIN D3) 1000 UNITS CAPS Take 1,000 Units by mouth daily     enalapril (VASOTEC) 10 MG tablet Take 20 mg by mouth daily      EPINEPHrine (EPIPEN/ADRENACLICK/OR ANY BX GENERIC EQUIV) 0.3 MG/0.3ML injection 2-pack Inject 0.3 mLs (0.3 mg) into the muscle as needed for anaphylaxis     fluorouracil (EFUDEX) 5 % cream      hydrochlorothiazide (HYDRODIURIL) 50 MG tablet Take 50 mg by mouth daily     latanoprost (XALATAN) 0.005 % ophthalmic solution      loperamide (IMODIUM) 2 MG capsule Take 2 mg by mouth 4 times  "daily as needed for diarrhea     Magnesium Oxide 250 MG TABS Take 250 mg by mouth daily     potassium chloride SA (K-DUR/KLOR-CON M) 10 MEQ CR tablet Take 20 mEq by mouth 2 times daily (with meals)     RABEprazole (ACIPHEX) 20 MG EC tablet Take 1 tablet (20 mg) by mouth daily     Tadalafil (CIALIS PO) Take 5 mg by mouth daily     tamsulosin (FLOMAX) 0.4 MG capsule Take 0.4 mg by mouth every 12 hours      terazosin (HYTRIN) 5 MG capsule Take 10 mg by mouth At Bedtime      No current facility-administered medications for this visit.              Review of Systems:     Skin: negative  Eyes: negative  Ears/Nose/Throat: negative  Respiratory: No shortness of breath, dyspnea on exertion, cough, or hemoptysis  Cardiovascular: negative  Gastrointestinal: negative  Genitourinary: as above  Musculoskeletal: negative  Neurologic: negative  Psychiatric: negative  Hematologic/Lymphatic/Immunologic: negative  Endocrine: negative          Physical Exam:     Patient Vitals for the past 24 hrs:   BP Temp Temp src Pulse Resp SpO2 Height Weight   03/12/19 1048 128/70 98.2  F (36.8  C) Tympanic 60 16 95 % 1.753 m (5' 9\") 81.6 kg (180 lb)     Body mass index is 26.58 kg/m .     General: age-appropriate appearing male in NAD  HEENT: Head AT/NC, EOMI, CN Grossly intact  Lungs: no respiratory distress, or pursed lip breathing  Heart: No obvious jugular venous distension present  Back: no bony midline tenderness, no CVAT bilaterally.  Abdomen: soft, non-distended, non-tender. No organomegaly  : normal male external genitalia.   Lymph: no palpable inguinal lymphadenopathy.  LE: no edema.   Musculoskeltal: extremities normal, no peripheral edema  Skin: no suspicious lesions or rashes  Neuro: Alert, oriented, speech and mentation normal;  moving all 4 extremities equally.  Psych: affect and mood normal          Data:   All laboratory data reviewed:    UA RESULTS:  Recent Labs   Lab Test 11/23/15  0943   COLOR Yellow   URINEGLC Negative "   URINEKETONE Negative   SG 1.025   NITRITE Negative   LEUKEST Negative      PSA from Care Everywhere:  3/2/2017     2.398  11/23/15     2.754  6/21/13       2.64      Lab Results   Component Value Date    CR 1.33 01/25/2019        All pertinent imaging reviewed:    All imaging studies reviewed by me.  I personally reviewed these imaging films.    I measured the prostate and calculated the volume at ~104cc  A formal report from radiology will follow.    FINDINGS:       Limited images through the lung bases demonstrate scarring or minimal  fibrosis at the bases. No pericardial or pleural effusion is seen.     Precontrast imaging demonstrates no renal or ureteral calculi.  Nephrographic phase imaging demonstrates parapelvic cysts on the left.  Delayed imaging demonstrates normal ureteral opacification. The  bladder is within normal limits. The prostate measures 6 cm.     The appearance of the liver, spleen, and adrenal glands is  unremarkable.  Sludge or small stones are noted in the gallbladder  dependently. There is fatty atrophy of the pancreas. The aorta is  normal in size with scattered atherosclerotic calcifications. The  bowel is normal in caliber.     No free fluid, free air or adenopathy is present. Advanced  degenerative changes are present in the lumbar spine, associated with  prior multilevel laminectomy without surgical fusion.                                                                      IMPRESSION:       Marked prostatomegaly. No other finding to account for hematuria.    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 long standing history of BPH with LUTS with recent episode of gross hematuria      Plan:   Gross hematuria  - CT Urogram with no evidence of upper tract abnormality  - Cystoscopy today with evidence of significant bilobar prostatic hypertrophy with moderate trabeculation and no evidence of bladder lesions  - Urine cytology  obtained during cystoscopy    BPH with LUTS  - I calculated the volume at 104cc  - PVR today 145cc  - He is on tamsulosin 0.4mg (Flomax), terazosin (Hytrin) 5mg, and tadalafil (Cialis) 5mg daily  - We discussed discussed the options of continued medical management with possible addition of finasteride 5mg (Proscar) vs consideration for bladder outlet procedure  - He is not interested in finasteride 5mg (Proscar) due to concern for increased prostate cancer risk - we discussed this thoroughly and he continues to wish to avoid this medication  - We discussed bladder outlet procedures of bipolar TURP vs Greenlight photovaporization of the prostate (PVP) vs robotic simple prostatectomy. He strongly prefers the bipolar TURP  - He will think about options and call my [facility] office if he decides to proceed with a bipolar TURP. We would plan for 1-2 nights in the hospital due to the size of his prostate     Thank you for the kind consultation.    Time spent: 40 minutes of which >50% was spent counseling.    Oleg Valladares MD   Urology  [facility]  [facility] Phone: [phone]  [facility] Phone: [phone]

## 2019-03-12 NOTE — PROCEDURES
CYSTOSCOPY PROCEDURE NOTE:    Ha Jacome is a 85 year old male  who presents with gross hematuria for cystoscopy.    Pt ID verified with patient: Yes     Procedure verified with patient: Yes     Procedure confirmed with physician and support staff: Yes     Consent form confirmed with physician and support staff.    Sign In  History and Physical Exam reviewed and Yes .  Informed Consent Discussed: Yes   Sign in Communication: Yes   Time Out:  Team Confirms the Correct Patient, Correct Procedure; Yes , Correct Site and Site Marking, Correct Position (if applicable).    Affirmation of Time Out: Yes   Sign Out:  Sign Out Discussion: Yes   Physician: Oleg Valladares MD    A urinalysis was performed revealing no evidence of infection.    The benefits, risks, alternatives of the cystoscopy procedure and personnel were discussed with the patient. The verbal consent was obtained and the patient agrees to proceed.      Description of procedure:   After fully informed, voluntary consent was obtained, the patient was brought into the procedure room, identified and placed in a supine position on the cystoscopy table.  The groin/scrotum were prepped with betadine and draped in a sterile fashion.  Urojet lidocaine gel was introduced.  A 15F flexible cystoscope was inserted into the urethra, and the bladder and urethra wereexamined in a systematic manner.  The patient tolerated the procedure well and there were no complications.      Cystoscopic findings:  The urethra was normal without strictures.  The prostate was ~4cm long and demonstrated significant bilobar hypertrophy.  There was no median lobe however the lateral lobes protruded into the bladder.  The external sphincter coapted normally and the bladder neck was normal. The bladder was  entered and careful pan endoscopy was carried out. The posterior, superior and lateral walls and dome of the bladder were all well visualized and the scope was retroflexed upon itself..   There was moderate trabeculation.  There were no neoplasms, stones, or diverticula identifed.  The ureteric orifices were normal in position and number and effluxing clear urine.    Assessment/Plan:   Ha Jacome is a 85 year old male with a history of gross hematuria and long standing BPH with LUTS.     - See clinic note    Oleg Valladares MD

## 2019-03-12 NOTE — NURSING NOTE
"Chief Complaint   Patient presents with     Cystoscopy       Initial /70 (BP Location: Left arm, Patient Position: Sitting, Cuff Size: Adult Regular)   Pulse 60   Temp 98.2  F (36.8  C) (Tympanic)   Resp 16   Ht 1.753 m (5' 9\")   Wt 81.6 kg (180 lb)   SpO2 95%   BMI 26.58 kg/m   Estimated body mass index is 26.58 kg/m  as calculated from the following:    Height as of this encounter: 1.753 m (5' 9\").    Weight as of this encounter: 81.6 kg (180 lb).  Medication Reconciliation: complete    Apolonia Horan LPN  "

## 2019-03-14 LAB — COPATH REPORT: NORMAL

## 2019-04-09 DIAGNOSIS — N40.1 BENIGN PROSTATIC HYPERPLASIA WITH LOWER URINARY TRACT SYMPTOMS, SYMPTOM DETAILS UNSPECIFIED: Primary | ICD-10-CM

## 2019-04-09 DIAGNOSIS — I10 ESSENTIAL HYPERTENSION: ICD-10-CM

## 2019-04-11 RX ORDER — TERAZOSIN 2 MG/1
CAPSULE ORAL
Qty: 90 CAPSULE | Refills: 3 | Status: SHIPPED | OUTPATIENT
Start: 2019-04-11 | End: 2019-08-19

## 2019-04-11 NOTE — TELEPHONE ENCOUNTER
Chart review shows that Rx as requested is not on patient's active med list. Patient with terazosin as follows on his active med list, but it is historical:    Outpatient Medication Detail      Disp Refills Start End ROGER   terazosin (HYTRIN) 5 MG capsule   6/21/2013  --   Sig - Route: Take 10 mg by mouth At Bedtime  - Oral   Class: Historical   Order: [id number]   Printout Tracking     External Result Report   Medication Notes          TONY HORTON Jan 31, 2018  3:18 PM (CST)  Received from: [facility] Received Sig: Take 1 capsule by mouth 2 times daily.              Call placed to patient to discuss. Patient reports that he is taking 7 mg daily and that he takes a 5 mg tab as well as a 2 mg tab. He is looking for refills of the 2 mg tabs at this time. Would like 90 day supplies with 3 refills.    Writer will dom up and route Rx request to PCP for his consideration/approval. Patient reports [place] so an annual supply is preferred.    Unable to complete prescription refill per RN Medication Refill Policy. Armando Arrington 4/11/2019 3:11 PM   Unable to assess hearing

## 2019-04-25 ENCOUNTER — OFFICE VISIT (OUTPATIENT)
Dept: FAMILY MEDICINE | Facility: OTHER | Age: 84
End: 2019-04-25
Attending: FAMILY MEDICINE
Payer: MEDICARE

## 2019-04-25 VITALS
BODY MASS INDEX: 27.47 KG/M2 | WEIGHT: 186 LBS | RESPIRATION RATE: 16 BRPM | SYSTOLIC BLOOD PRESSURE: 136 MMHG | HEART RATE: 70 BPM | TEMPERATURE: 98.1 F | DIASTOLIC BLOOD PRESSURE: 80 MMHG

## 2019-04-25 DIAGNOSIS — R35.0 BENIGN PROSTATIC HYPERPLASIA WITH URINARY FREQUENCY: ICD-10-CM

## 2019-04-25 DIAGNOSIS — N42.9 PROSTATE DISORDER: Primary | ICD-10-CM

## 2019-04-25 DIAGNOSIS — M10.9 GOUT, UNSPECIFIED CAUSE, UNSPECIFIED CHRONICITY, UNSPECIFIED SITE: ICD-10-CM

## 2019-04-25 DIAGNOSIS — E87.6 HYPOKALEMIA: ICD-10-CM

## 2019-04-25 DIAGNOSIS — I10 ESSENTIAL HYPERTENSION: ICD-10-CM

## 2019-04-25 DIAGNOSIS — M19.90 OSTEOARTHRITIS, UNSPECIFIED OSTEOARTHRITIS TYPE, UNSPECIFIED SITE: ICD-10-CM

## 2019-04-25 DIAGNOSIS — L57.0 ACTINIC KERATOSIS: ICD-10-CM

## 2019-04-25 DIAGNOSIS — M53.3 SACROILIAC JOINT DYSFUNCTION: ICD-10-CM

## 2019-04-25 DIAGNOSIS — N40.1 BENIGN PROSTATIC HYPERPLASIA WITH URINARY FREQUENCY: ICD-10-CM

## 2019-04-25 LAB — PSA SERPL-MCNC: 3.77 NG/ML

## 2019-04-25 PROCEDURE — 17000 DESTRUCT PREMALG LESION: CPT | Performed by: FAMILY MEDICINE

## 2019-04-25 PROCEDURE — 84153 ASSAY OF PSA TOTAL: CPT | Mod: ZL | Performed by: FAMILY MEDICINE

## 2019-04-25 PROCEDURE — G0463 HOSPITAL OUTPT CLINIC VISIT: HCPCS

## 2019-04-25 PROCEDURE — 17003 DESTRUCT PREMALG LES 2-14: CPT | Performed by: FAMILY MEDICINE

## 2019-04-25 PROCEDURE — G0463 HOSPITAL OUTPT CLINIC VISIT: HCPCS | Mod: 25

## 2019-04-25 PROCEDURE — 99214 OFFICE O/P EST MOD 30 MIN: CPT | Mod: 25 | Performed by: FAMILY MEDICINE

## 2019-04-25 PROCEDURE — 36415 COLL VENOUS BLD VENIPUNCTURE: CPT | Mod: ZL | Performed by: FAMILY MEDICINE

## 2019-04-25 RX ORDER — ENALAPRIL MALEATE 10 MG/1
20 TABLET ORAL DAILY
Qty: 180 TABLET | Refills: 11 | Status: SHIPPED | OUTPATIENT
Start: 2019-04-25

## 2019-04-25 RX ORDER — TERAZOSIN 5 MG/1
5 CAPSULE ORAL AT BEDTIME
Qty: 90 CAPSULE | Refills: 11 | Status: SHIPPED | OUTPATIENT
Start: 2019-04-25

## 2019-04-25 RX ORDER — HYDROCHLOROTHIAZIDE 50 MG/1
50 TABLET ORAL DAILY
Qty: 90 TABLET | Refills: 11 | Status: SHIPPED | OUTPATIENT
Start: 2019-04-25 | End: 2020-05-22

## 2019-04-25 RX ORDER — CELECOXIB 100 MG/1
100 CAPSULE ORAL 2 TIMES DAILY
Qty: 180 CAPSULE | Refills: 11 | Status: SHIPPED | OUTPATIENT
Start: 2019-04-25

## 2019-04-25 RX ORDER — TADALAFIL 5 MG/1
5 TABLET ORAL DAILY
Qty: 90 TABLET | Refills: 11 | Status: SHIPPED | OUTPATIENT
Start: 2019-04-25

## 2019-04-25 RX ORDER — POTASSIUM CHLORIDE 750 MG/1
20 TABLET, EXTENDED RELEASE ORAL 2 TIMES DAILY WITH MEALS
Qty: 180 TABLET | Refills: 11 | Status: SHIPPED | OUTPATIENT
Start: 2019-04-25

## 2019-04-25 RX ORDER — TAMSULOSIN HYDROCHLORIDE 0.4 MG/1
0.4 CAPSULE ORAL EVERY 12 HOURS
Qty: 180 CAPSULE | Refills: 11 | Status: SHIPPED | OUTPATIENT
Start: 2019-04-25

## 2019-04-25 RX ORDER — ALLOPURINOL 100 MG/1
100 TABLET ORAL DAILY
Qty: 90 TABLET | Refills: 11 | Status: SHIPPED | OUTPATIENT
Start: 2019-04-25

## 2019-04-25 ASSESSMENT — PAIN SCALES - GENERAL: PAINLEVEL: NO PAIN (0)

## 2019-04-25 NOTE — LETTER
April 25, 2019      Ha Jacome  [address]  [place] [address]        Dear Patrick,     I sent you a text, but just so you have a hard copy, here is your PSA.    Results for orders placed or performed in visit on 04/25/19   Prostate Specific Antigen GH   Result Value Ref Range    Prostate Specific Antigen 3.771 (H) <3.100 ng/mL        It was a pleasure seeing you the other day.  If you have any questions, please don't hesitate to call us.       Sincerely,        Garry Alberts MD

## 2019-04-25 NOTE — PROGRESS NOTES
"Nursing Notes:   Ava Paulson LPN  4/25/2019  8:27 AM  Signed  Chief Complaint   Patient presents with     Recheck Medication     moving and he need refills        Initial /80   Pulse 70   Temp 98.1  F (36.7  C) (Temporal)   Resp 16   Wt 84.4 kg (186 lb)   BMI 27.47 kg/m    Estimated body mass index is 27.47 kg/m  as calculated from the following:    Height as of 3/12/19: 1.753 m (5' 9\").    Weight as of this encounter: 84.4 kg (186 lb).  Medication Reconciliation: complete    Ava Paulson LPN    SUBJECTIVE:  Ha Jacome  is a 85 year old male who comes in for several concerns. They sold their home to a couple from the [facility].  They are closing May 30 and are moving.     He had a recent bout with gross hematuria about a month ago.  He had a CT urogram showing no evidence of upper tract issues, but an extremely enlarged prostate was seen.  He is currently on Flomax, Hytrin 5 mg and Cialis 5 mg daily.  He had cystoscopy which was negative, as was urine cytology.  They talked about options for treatment for his enlarged prostate and may at some point he like to do a bipolar TURP.      He has had intermittent diarrhea for years.  He had some fatty atrophy of the pancreas noted on his CT.  We talked about some pancreatic supplementation to see if that would mitigate his symptoms of diarrhea because of some possible insufficiency or a low-grade pancreatitis.  Fatty atrophy of the pancreas has not really been associated with increased incidence of type 2 diabetes but is probably more of a manifestation of a previous pancreatitis or chronic pancreatitis. He had normal colonoscopy in 2018 showing no evidence of microscopic colitis in the entire colon was otherwise normal.  His symptoms got better when he eliminated milk. He has had some leakage in the past as well, and may have a little bit of sphincter incompetence after his back surgery.    He had some trouble with his low back for which we saw " him at the end of January.  He has decreased stability and hypermobility in his back from his previous surgery. He at times will wear a corset for support.  He was having sudden severe pain in his low back that would be incapacitating away quickly was over the left SI and seem to be positional.  We sent him to physical therapy for evaluation and treatment, and he chose to go to [facility]. He saw Tristan Perez.  He faithfully did the exercises and he began having pain on the right and the left SI.  He was feeling worse.  He had more severe SI pain. He couldn't wear the corset because it hurt. He had stopped Celebrex but he resumed it and took some Tylenol #3.     He flew to [place] and made an offer on a Hyper9 and came back yesterday. He is very tired. He couldn't wear the corset on the plane.     He has hypertension for which he takes enalapril.  He also takes 50 mg of hydrochlorothiazide.      He would like us to check his prostate. He has some actinics he'd like treated.      Past Medical, Family, and Social History reviewed and updated as noted below.   ROS is negative except as noted above       Allergies   Allergen Reactions     Food Nausea     Mussels - diarrhea, vomiting     Chlorpheniramine      Other reaction(s): Runny Nose   ,   Family History   Problem Relation Age of Onset     Cancer Father         Cancer, prostate cancer in his late 70's treatment with radiation therapy.     Heart Disease Father         Heart Disease     Genetic Disorder Other         Genetic,Mother   in her mid 80's of adrenal crisis after steroid dependent rheumatoid arthritis and acute illness.  -Father   at age 94.  He had carotid disease, prostate cancer in his late 70's treatment with radiation therapy.   ,   Current Outpatient Medications   Medication     acetaminophen-codeine (TYLENOL #3) 300-30 MG tablet     allopurinol (ZYLOPRIM) 100 MG tablet     celecoxib (CELEBREX) 100 MG capsule     enalapril (VASOTEC) 10 MG  tablet     hydrochlorothiazide (HYDRODIURIL) 50 MG tablet     potassium chloride ER (K-DUR/KLOR-CON M) 10 MEQ CR tablet     tadalafil (CIALIS) 5 MG tablet     tamsulosin (FLOMAX) 0.4 MG capsule     terazosin (HYTRIN) 5 MG capsule     Cholecalciferol (VITAMIN D3) 1000 UNITS CAPS     EPINEPHrine (EPIPEN/ADRENACLICK/OR ANY BX GENERIC EQUIV) 0.3 MG/0.3ML injection 2-pack     fluorouracil (EFUDEX) 5 % cream     latanoprost (XALATAN) 0.005 % ophthalmic solution     loperamide (IMODIUM) 2 MG capsule     Magnesium Oxide 250 MG TABS     RABEprazole (ACIPHEX) 20 MG EC tablet     terazosin (HYTRIN) 2 MG capsule     No current facility-administered medications for this visit.    ,   Past Medical History:   Diagnosis Date     Essential (primary) hypertension     No Comments Provided     Gilbert's syndrome     No Comments Provided   ,   Patient Active Problem List    Diagnosis Date Noted     Fatty Atrophy, pancreas 01/31/2019     Priority: Medium     Osteoarthritis of lumbar spine, unspecified spinal osteoarthritis complication status 01/31/2019     Priority: Medium     Chronic prostatitis 06/25/2018     Priority: Medium     Glaucoma 01/31/2018     Priority: Medium     Hypertension 01/31/2018     Priority: Medium     Benign prostatic hyperplasia with urinary frequency 01/31/2018     Priority: Medium     Osteoarthrosis 01/31/2018     Priority: Medium     Actinic keratoses 06/21/2013     Priority: Medium     Gilbert's syndrome 06/21/2013     Priority: Medium   ,   Past Surgical History:   Procedure Laterality Date     ARTHROPLASTY KNEE      2006,right     COLONOSCOPY      11/30/2016,normal, Dr. Brandt [facility]     CYSTOSCOPY      8/2015,Ming Chou MD  normal     EXTRACAPSULAR CATARACT EXTRATION WITH INTRAOCULAR LENS IMPLANT      12/26/18,right     HERNIORRHAPHY INGUINAL BILATERAL      1/2/09,laparoscopic     OTHER SURGICAL HISTORY      2007,[id number],OTHER,left     OTHER SURGICAL HISTORY      2008,[id number],OTHER     OTHER SURGICAL  HISTORY      9/8/08,[id number],OTHER,MD [place]     OTHER SURGICAL HISTORY      9/2010,[id number],OTHER,[place]     OTHER SURGICAL HISTORY      11/30/2016,[id number],ESOPHAGOGASTRODUODENOSCOPY,Normal. No further follow up for Ortega's needed. Dr. Brandt    and   Social History     Tobacco Use     Smoking status: Never Smoker     Smokeless tobacco: Never Used   Substance Use Topics     Alcohol use: Yes     Alcohol/week: 3.5 oz     OBJECTIVE:  /80   Pulse 70   Temp 98.1  F (36.7  C) (Temporal)   Resp 16   Wt 84.4 kg (186 lb)   BMI 27.47 kg/m     EXAM:  Alert and cooperative, no distress.  Rectal exam, prostate is enlarged but symmetrically so and is probably about 50 g.  There are no palpable nodules.  He has 5 actinic's on his face that we treated lightly with liquid nitrogen    Results for orders placed or performed in visit on 04/25/19   Prostate Specific Antigen GH   Result Value Ref Range    Prostate Specific Antigen 3.771 (H) <3.100 ng/mL      ASSESSMENT/Plan :    Ha was seen today for recheck medication.    Diagnoses and all orders for this visit:    Prostate disorder  -     Prostate Specific Antigen GH; Future  -     Prostate Specific Antigen GH    Sacroiliac joint dysfunction  -     celecoxib (CELEBREX) 100 MG capsule; Take 1 capsule (100 mg) by mouth 2 times daily    Osteoarthritis, unspecified osteoarthritis type, unspecified site  -     celecoxib (CELEBREX) 100 MG capsule; Take 1 capsule (100 mg) by mouth 2 times daily  -     acetaminophen-codeine (TYLENOL #3) 300-30 MG tablet; Max acetaminophen dose: 4000mg in 24 hrs.TAKE 1-2 TABLETS BY MOUTH EVERY 4 HOURS AS NEEDED. Y    Benign prostatic hyperplasia with urinary frequency  -     terazosin (HYTRIN) 5 MG capsule; Take 1 capsule (5 mg) by mouth At Bedtime  -     tamsulosin (FLOMAX) 0.4 MG capsule; Take 1 capsule (0.4 mg) by mouth every 12 hours  -     tadalafil (CIALIS) 5 MG tablet; Take 1 tablet (5 mg) by  mouth daily    Gout, unspecified cause, unspecified chronicity, unspecified site  -     allopurinol (ZYLOPRIM) 100 MG tablet; Take 1 tablet (100 mg) by mouth daily    Actinic keratosis  -     DESTRUCT PREMALIGNANT LESION, FIRST  -     DESTRUCT PREMALIGNANT LESION, 2-14    Essential hypertension  -     hydrochlorothiazide (HYDRODIURIL) 50 MG tablet; Take 1 tablet (50 mg) by mouth daily  -     enalapril (VASOTEC) 10 MG tablet; Take 2 tablets (20 mg) by mouth daily    Hypokalemia  -     potassium chloride ER (K-DUR/KLOR-CON M) 10 MEQ CR tablet; Take 2 tablets (20 mEq) by mouth 2 times daily (with meals)      Renewed his medications.  Trial of Celebrex for his back.  Tylenol 3 to use for more significant back pain.   query is appropriate.    Advised regarding the pain and vesiculation reaction that could be expected from cyrotherapy.     A total of 25 minutes was spent with the patient, greater than 50% of the time was spent in counseling/discussion of the aforementioned concerns.     Garry Alberts MD

## 2019-04-25 NOTE — NURSING NOTE
"Chief Complaint   Patient presents with     Recheck Medication     moving and he need refills        Initial /80   Pulse 70   Temp 98.1  F (36.7  C) (Temporal)   Resp 16   Wt 84.4 kg (186 lb)   BMI 27.47 kg/m   Estimated body mass index is 27.47 kg/m  as calculated from the following:    Height as of 3/12/19: 1.753 m (5' 9\").    Weight as of this encounter: 84.4 kg (186 lb).  Medication Reconciliation: complete    Ava Paulson LPN  "

## 2019-08-19 DIAGNOSIS — N40.1 BENIGN PROSTATIC HYPERPLASIA WITH LOWER URINARY TRACT SYMPTOMS, SYMPTOM DETAILS UNSPECIFIED: ICD-10-CM

## 2019-08-19 DIAGNOSIS — I10 ESSENTIAL HYPERTENSION: ICD-10-CM

## 2019-08-19 RX ORDER — TERAZOSIN 2 MG/1
CAPSULE ORAL
Qty: 180 CAPSULE | Refills: 3 | Status: SHIPPED | OUTPATIENT
Start: 2019-08-19

## 2019-08-19 NOTE — TELEPHONE ENCOUNTER
He is having a hard time finding a PCP. He is still trying to find one. Can he have a refill?  Ava Paulson LPN..................8/19/2019   9:56 AM

## 2019-09-30 RX ORDER — POTASSIUM CHLORIDE 750 MG/1
TABLET, EXTENDED RELEASE ORAL
Qty: 360 TABLET | Refills: 9 | OUTPATIENT
Start: 2019-09-30
